# Patient Record
Sex: MALE | Race: OTHER | Employment: FULL TIME | URBAN - METROPOLITAN AREA
[De-identification: names, ages, dates, MRNs, and addresses within clinical notes are randomized per-mention and may not be internally consistent; named-entity substitution may affect disease eponyms.]

---

## 2017-01-27 ENCOUNTER — TELEPHONE (OUTPATIENT)
Dept: FAMILY MEDICINE CLINIC | Age: 57
End: 2017-01-27

## 2017-01-27 NOTE — TELEPHONE ENCOUNTER
Place call to [facility] in reference to [facility] ER follow up/My-chart message in reference to referral to GI. Voicemail is not set up to leave a message.

## 2017-01-30 NOTE — TELEPHONE ENCOUNTER
Place call to [facility] per his mobile device not excepting any phones at this time.

## 2017-02-06 NOTE — TELEPHONE ENCOUNTER
Spoke with patient. He was advised that a follow up is needed to discuss [facility] ED visit. Offered to schedule patient for an appointment during phone conversation but he declines stating he does not have his work schedule. Patient states he will call back to schedule.

## 2017-07-05 ENCOUNTER — OFFICE VISIT (OUTPATIENT)
Dept: FAMILY MEDICINE CLINIC | Age: 57
End: 2017-07-05

## 2017-07-05 VITALS
DIASTOLIC BLOOD PRESSURE: 80 MMHG | TEMPERATURE: 98.2 F | HEART RATE: 83 BPM | WEIGHT: 238 LBS | BODY MASS INDEX: 33.32 KG/M2 | OXYGEN SATURATION: 98 % | RESPIRATION RATE: 16 BRPM | HEIGHT: 71 IN | SYSTOLIC BLOOD PRESSURE: 122 MMHG

## 2017-07-05 DIAGNOSIS — H02.9 EYELID ABNORMALITY: ICD-10-CM

## 2017-07-05 DIAGNOSIS — E78.00 HYPERCHOLESTEROLEMIA: ICD-10-CM

## 2017-07-05 DIAGNOSIS — E55.9 VITAMIN D DEFICIENCY: ICD-10-CM

## 2017-07-05 DIAGNOSIS — B35.4 TINEA CORPORIS: ICD-10-CM

## 2017-07-05 DIAGNOSIS — R22.32 MASS OF LEFT WRIST: Primary | ICD-10-CM

## 2017-07-05 DIAGNOSIS — Z12.5 PROSTATE CANCER SCREENING: ICD-10-CM

## 2017-07-05 DIAGNOSIS — M25.532 WRIST PAIN, ACUTE, LEFT: ICD-10-CM

## 2017-07-05 DIAGNOSIS — R73.03 PREDIABETES: ICD-10-CM

## 2017-07-05 RX ORDER — GLUCOSAMINE SULFATE 1500 MG
3000 POWDER IN PACKET (EA) ORAL DAILY
COMMUNITY
End: 2017-07-18

## 2017-07-05 RX ORDER — CLOTRIMAZOLE AND BETAMETHASONE DIPROPIONATE 10; .64 MG/G; MG/G
CREAM TOPICAL
Qty: 15 G | Refills: 0 | Status: SHIPPED | OUTPATIENT
Start: 2017-07-05 | End: 2018-08-23 | Stop reason: SDUPTHER

## 2017-07-05 NOTE — PROGRESS NOTES
Chief Complaint   Patient presents with    Cyst     left wrist cyst x 3-4 days    Skin Problem     mole on right eye; larger in size     1. Have you been to the ER, urgent care clinic since your last visit? Hospitalized since your last visit? No    2. Have you seen or consulted any other health care providers outside of the [address] [place] [address] since your last visit? Include any pap smears or colon screening.  No

## 2017-07-05 NOTE — PROGRESS NOTES
SUBJECTIVE  Chief Complaint   Patient presents with    Cyst     left wrist cyst x 3-4 days    Skin Problem     mole on right eye; larger in size     Few issues. Noticed left radial sided swelling and pain for a few days. Has a lesion on his right upper eyelid that he wants removed. Has ha rash on his inner right shin that is itchy. OBJECTIVE    Blood pressure 122/80, pulse 83, temperature 98.2 °F (36.8 °C), temperature source Oral, resp. rate 16, height 5' 11\" (1.803 m), weight 238 lb (108 kg), SpO2 98 %. General:  Alert, cooperative, well appearing, in no apparent distress. Head:  Head is symmetric, normocephalic without evidence of trauma or deformity. Eyes:  Pupils are equally round and reactive to light with accommodation. The extra-ocular movements are intact. The lids are without swelling or drainage. There is a slightly hyperppigmented growth on his right eyelid that is about 4mm in length and 2mm in width. Tip is verrucous. The conjunctiva are clear and noninjected. Skin:  There is a 1cm irregularly bordered flat lesion on his left inner shin that appears with a slightly raised erythematous border with central clearing. Left wrist:  Full ROM. There is a firm large 3cm x 4cm mass on the radial ventral side of his wrist.  No bony tenderness. No motor deficits. ASSESSMENT / PLAN    ICD-10-CM ICD-9-CM    1. Mass of left wrist R22.32 719.63 cholecalciferol (VITAMIN D3) 1,000 unit cap      MRI WRIST LT WO CONT   2. Wrist pain, acute, left M25.532 719.43 cholecalciferol (VITAMIN D3) 1,000 unit cap      MRI WRIST LT WO CONT   3. Eyelid abnormality H02.9 374.9 REFERRAL TO PLASTIC SURGERY   4. Tinea corporis B35.4 110.5    5. Hypercholesterolemia E78.00 272.0 CBC W/O DIFF      METABOLIC PANEL, COMPREHENSIVE      LIPID PANEL   6. Vitamin D deficiency E55.9 268.9 VITAMIN D, 25 HYDROXY   7.  Prediabetes R73.03 790.29 CBC W/O DIFF      METABOLIC PANEL, COMPREHENSIVE      HEMOGLOBIN A1C W/O EAG   8. Prostate cancer screening Z12.5 V76.44 PROSTATE SPECIFIC AG (PSA)     Mass of left wrist with pain - MRI of wrist without contrast.  Likely a ganglion cyst.  Pt ed handout. Eyelid abnormality - skin tag versus wart. He will see a plastic surgeon due to the location. Tinea corporis - topical lotrisone twice daily for 2 weeks. Prediabetes / hypercholesterolemia / Vit D def - ordered labs that are due. 15 minutes of face to face time spent with the patient with at least 50% on counseling on above medical issues. All chart history elements were reviewed by me at the time of the visit even though marked at time of note closure. Patient understands our medical plan. Patient has provided input and agrees with goals. Alternatives have been explained and offered. All questions answered. The patient is to call if condition worsens or fails to improve. Follow-up Disposition:  Return in about 2 weeks (around 7/19/2017), or routine care. Fasting labs due 3-7 days prior to appointment.

## 2017-07-05 NOTE — PATIENT INSTRUCTIONS
Ganglions: Care Instructions  Your Care Instructions    A ganglion is a small sac, or cyst, filled with a clear fluid that is like jelly. A ganglion may look like a bump on the hand or wrist. It also can appear on your feet, ankles, knees, or shoulders. It is not cancer. A ganglion can grow out of the protective area, or capsule, around a joint. It also can grow on a tendon sheath, which covers the ropelike tendons that connect muscle to bone. A ganglion may hurt or cause numbness if it presses on a nerve. Many ganglions do not need treatment, and they often go away on their own. But if a ganglion hurts, becomes larger, causes numbness, or limits your activity, your doctor may want to drain it with a needle and syringe or remove it with minor surgery. Follow-up care is a key part of your treatment and safety. Be sure to make and go to all appointments, and call your doctor if you are having problems. It's also a good idea to know your test results and keep a list of the medicines you take. How can you care for yourself at home? · Wear a wrist or finger splint as directed by your doctor. It will keep your wrist or hand from moving and help reduce the fluid in the cyst. This may be all you need for the ganglion to shrink and go away. · Do not smash a ganglion with a book or other heavy object. You may break a bone or otherwise injure your wrist by trying this folk remedy, and the ganglion may return anyway. · Do not try to drain the fluid by poking the ganglion with a pin or any other sharp object. You could cause an infection. When should you call for help? Call your doctor now or seek immediate medical care if:  · You have signs of infection, such as:  ¨ Increased pain, swelling, warmth, or redness. ¨ Red streaks leading from the cyst.  ¨ Pus draining from the cyst.  ¨ A fever. Watch closely for changes in your health, and be sure to contact your doctor if:  · You have increasing pain.   · Your ganglion is getting larger. · You still have pain or numbness from a ganglion. Where can you learn more? Go to http://marcela-alex.info/. Enter O174 in the search box to learn more about \"Ganglions: Care Instructions. \"  Current as of: March 21, 2017  Content Version: 11.3  © 7304-7064 [employer]. Care instructions adapted under license by [employer] (which disclaims liability or warranty for this information). If you have questions about a medical condition or this instruction, always ask your healthcare professional. [place] [address] any warranty or liability for your use of this information.     Dr. Shruthi Chapman   [address] [facility] [place], [address]  [phone]

## 2017-07-05 NOTE — MR AVS SNAPSHOT
Visit Information Date & Time Provider Department Dept. Phone Encounter #  
 7/5/2017  5:15 PM Leidy Helm, [address] [id number] Follow-up Instructions Return in about 2 weeks (around 7/19/2017), or routine care. Fasting labs due 3-7 days prior to appointment. Upcoming Health Maintenance Date Due Hepatitis C Screening 1960 INFLUENZA AGE 9 TO ADULT 8/1/2017 COLONOSCOPY 10/18/2023 DTaP/Tdap/Td series (3 - Td) 10/6/2026 Allergies as of 7/5/2017  Review Complete On: 12/28/2016 By: Rafat Magana NP No Known Allergies Current Immunizations  Reviewed on 10/6/2016 Name Date Influenza Vaccine 9/8/2014 Influenza Vaccine (Quad) PF 9/15/2016 TDAP Vaccine 2/5/2005 Tdap 10/6/2016 Not reviewed this visit You Were Diagnosed With   
  
 Codes Comments Mass of left wrist    -  Primary ICD-10-CM: R22.32 
ICD-9-CM: 719.63 Wrist pain, acute, left     ICD-10-CM: M25.532 ICD-9-CM: 719.43 Eyelid abnormality     ICD-10-CM: H02.9 ICD-9-CM: 374.9 Tinea corporis     ICD-10-CM: B35.4 ICD-9-CM: 110.5 Hypercholesterolemia     ICD-10-CM: E78.00 ICD-9-CM: 272.0 Vitamin D deficiency     ICD-10-CM: E55.9 ICD-9-CM: 268.9 Prediabetes     ICD-10-CM: R73.03 
ICD-9-CM: 790.29 Prostate cancer screening     ICD-10-CM: Z12.5 ICD-9-CM: V76.44 Vitals BP Pulse Temp Resp Height(growth percentile) Weight(growth percentile) 122/80 (BP 1 Location: Left arm, BP Patient Position: Sitting) 83 98.2 °F (36.8 °C) (Oral) 16 5' 11\" (1.803 m) 238 lb (108 kg) SpO2 BMI Smoking Status 98% 33.19 kg/m2 Former Smoker Vitals History BMI and BSA Data Body Mass Index Body Surface Area  
 33.19 kg/m 2 2.33 m 2 Preferred Pharmacy Pharmacy Name Phone [facility], [address] [place] [address] [phone] Your Updated Medication List  
  
   
This list is accurate as of: 7/5/17  5:56 PM.  Always use your most recent med list.  
  
  
  
  
 ammonium lactate 12 % topical cream  
Commonly known as:  LAC-HYDRIN Apply  to affected area two (2) times a day. rub in to affected area on feet as needed. atorvastatin 20 mg tablet Commonly known as:  LIPITOR  
TAKE 1 TABLET nightly Blood-Glucose Meter monitoring kit Use as directed  
  
 clotrimazole-betamethasone topical cream  
Commonly known as:  Brenda Canal Apply to left medial shin - pea sized amount or less twice daily for 2 weeks  
  
 cpap machine kit  
by Does Not Apply route. Auto CPAP at Range of 5-15  with heated humidifier. Mask: Mask of choice, please refit. Length of need 99 months. Replace mask and accessories as needed times 12 months. Download data at 30 days and fax at [phone]. Supplies for 99 month  
  
 diclofenac 1 % Gel Commonly known as:  VOLTAREN Apply  to affected area four (4) times daily. Apply to right knee four times daily  
  
 escitalopram oxalate 20 mg tablet Commonly known as:  Helen Polio Take 1 Tab by mouth daily. glucose blood VI test strips strip Commonly known as:  ASCENSIA AUTODISC VI, ONE TOUCH ULTRA TEST VI Check fasting and 1 hour after meals as needed. ketoconazole 2 % topical cream  
Commonly known as:  NIZORAL Apply  to affected area two (2) times a day. Lancets Misc Use as directed. loperamide 2 mg tablet Commonly known as:  IMMODIUM  
4mg initial dose, then 2mg after each loose stool max of 12 mg daily  Indications: Diarrhea BRYSON MULTIVITAMIN FOR MEN PO Take  by mouth. SYNTHROID 200 mcg tablet Generic drug:  levothyroxine Take 200 mcg by mouth Daily (before breakfast). triamcinolone acetonide 0.1 % topical cream  
Commonly known as:  KENALOG Apply  to affected area two (2) times daily as needed for Skin Irritation. use thin layer vardenafil 20 mg tablet Commonly known as:  LEVITRA Take 20 mg by mouth as needed. VITAMIN B-12 500 mcg tablet Generic drug:  cyanocobalamin Take 1,500 mcg by mouth daily. VITAMIN D3 1,000 unit Cap Generic drug:  cholecalciferol Take 3,000 Units by mouth daily. Prescriptions Sent to Pharmacy Refills  
 clotrimazole-betamethasone (LOTRISONE) topical cream 0 Sig: Apply to left medial shin - pea sized amount or less twice daily for 2 weeks Class: Normal  
 Pharmacy: [place] [id number] [address] [place] [address], [facility] #: [phone] We Performed the Following REFERRAL TO PLASTIC SURGERY [REF89 Custom] Comments:  
 Please evaluate patient for right upper eyelid lesion Follow-up Instructions Return in about 2 weeks (around 7/19/2017), or routine care. Fasting labs due 3-7 days prior to appointment. To-Do List   
 07/05/2017 Lab:  CBC W/O DIFF   
  
 07/05/2017 Lab:  HEMOGLOBIN A1C W/O EAG   
  
 07/05/2017 Lab:  LIPID PANEL   
  
 07/05/2017 Lab:  METABOLIC PANEL, COMPREHENSIVE   
  
 07/05/2017 Imaging:  MRI WRIST LT WO CONT   
  
 07/05/2017 Lab:  PROSTATE SPECIFIC AG (PSA) 07/05/2017 Lab:  VITAMIN D, 25 HYDROXY Referral Information Referral ID Referred By Referred To  
  
 [id number] [address] Phone: [phone] Fax: [phone] Visits Status Start Date End Date 1 New Request 7/5/17 7/5/18 If your referral has a status of pending review or denied, additional information will be sent to support the outcome of this decision. Patient Instructions Ganglions: Care Instructions Your Care Instructions A ganglion is a small sac, or cyst, filled with a clear fluid that is like jelly.  A ganglion may look like a bump on the hand or wrist. It also can appear on your feet, ankles, knees, or shoulders. It is not cancer. A ganglion can grow out of the protective area, or capsule, around a joint. It also can grow on a tendon sheath, which covers the ropelike tendons that connect muscle to bone. A ganglion may hurt or cause numbness if it presses on a nerve. Many ganglions do not need treatment, and they often go away on their own. But if a ganglion hurts, becomes larger, causes numbness, or limits your activity, your doctor may want to drain it with a needle and syringe or remove it with minor surgery. Follow-up care is a key part of your treatment and safety. Be sure to make and go to all appointments, and call your doctor if you are having problems. It's also a good idea to know your test results and keep a list of the medicines you take. How can you care for yourself at home? · Wear a wrist or finger splint as directed by your doctor. It will keep your wrist or hand from moving and help reduce the fluid in the cyst. This may be all you need for the ganglion to shrink and go away. · Do not smash a ganglion with a book or other heavy object. You may break a bone or otherwise injure your wrist by trying this folk remedy, and the ganglion may return anyway. · Do not try to drain the fluid by poking the ganglion with a pin or any other sharp object. You could cause an infection. When should you call for help? Call your doctor now or seek immediate medical care if: 
· You have signs of infection, such as: 
¨ Increased pain, swelling, warmth, or redness. ¨ Red streaks leading from the cyst. 
¨ Pus draining from the cyst. 
¨ A fever. Watch closely for changes in your health, and be sure to contact your doctor if: 
· You have increasing pain. · Your ganglion is getting larger. · You still have pain or numbness from a ganglion. Where can you learn more? Go to http://marcela-alex.info/. Enter Y375 in the search box to learn more about \"Ganglions: Care Instructions. \" Current as of: March 21, 2017 Content Version: 11.3 © 1412-9209 [employer]. Care instructions adapted under license by [employer] (which disclaims liability or warranty for this information). If you have questions about a medical condition or this instruction, always ask your healthcare professional. [address]  any warranty or liability for your use of this information. Dr. Gabe Dee [address], [address] 
[phone] Introducing \[facility]\"" & HEALTH SERVICES! Dear Andrey Hidalgo: Thank you for requesting a [facility] account. Our records indicate that you already have an active [facility] account. You can access your account anytime at [email or web address]. [email or web address] Did you know that you can access your hospital and ER discharge instructions at any time in [facility]? You can also review all of your test results from your hospital stay or ER visit. Additional Information If you have questions, please visit the Frequently Asked Questions section of the [employer] website at [email or web address] Remember, [facility] is NOT to be used for urgent needs. For medical emergencies, dial 911. Now available from your iPhone and Android! Please provide this summary of care documentation to your next provider. Your primary care clinician is listed as Taryn Campbell. If you have any questions after today's visit, please call [phone].

## 2017-07-07 ENCOUNTER — HOSPITAL ENCOUNTER (OUTPATIENT)
Dept: LAB | Age: 57
Discharge: HOME | End: 2017-07-07
Payer: COMMERCIAL

## 2017-07-07 DIAGNOSIS — R73.03 PREDIABETES: ICD-10-CM

## 2017-07-07 DIAGNOSIS — E55.9 VITAMIN D DEFICIENCY: ICD-10-CM

## 2017-07-07 DIAGNOSIS — Z12.5 PROSTATE CANCER SCREENING: ICD-10-CM

## 2017-07-07 DIAGNOSIS — E78.00 HYPERCHOLESTEROLEMIA: ICD-10-CM

## 2017-07-07 LAB
25(OH)D3 SERPL-MCNC: 29.9 NG/ML (ref 30–100)
ALBUMIN SERPL BCP-MCNC: 3.9 G/DL (ref 3.4–5)
ALBUMIN/GLOB SERPL: 0.9 {RATIO} (ref 0.8–1.7)
ALP SERPL-CCNC: 90 U/L (ref 45–117)
ALT SERPL-CCNC: 43 U/L (ref 16–61)
ANION GAP BLD CALC-SCNC: 6 MMOL/L (ref 3–18)
AST SERPL W P-5'-P-CCNC: 25 U/L (ref 15–37)
BILIRUB SERPL-MCNC: 0.6 MG/DL (ref 0.2–1)
BUN SERPL-MCNC: 14 MG/DL (ref 7–18)
BUN/CREAT SERPL: 14 (ref 12–20)
CALCIUM SERPL-MCNC: 8.8 MG/DL (ref 8.5–10.1)
CHLORIDE SERPL-SCNC: 103 MMOL/L (ref 100–108)
CHOLEST SERPL-MCNC: 174 MG/DL
CO2 SERPL-SCNC: 30 MMOL/L (ref 21–32)
CREAT SERPL-MCNC: 1.03 MG/DL (ref 0.6–1.3)
ERYTHROCYTE [DISTWIDTH] IN BLOOD BY AUTOMATED COUNT: 13.2 % (ref 11.6–14.5)
GLOBULIN SER CALC-MCNC: 4.2 G/DL (ref 2–4)
GLUCOSE SERPL-MCNC: 114 MG/DL (ref 74–99)
HBA1C MFR BLD: 6.6 % (ref 4.2–5.6)
HCT VFR BLD AUTO: 44.3 % (ref 36–48)
HDLC SERPL-MCNC: 46 MG/DL (ref 40–60)
HDLC SERPL: 3.8 {RATIO} (ref 0–5)
HGB BLD-MCNC: 15.2 G/DL (ref 13–16)
LDLC SERPL CALC-MCNC: 77.4 MG/DL (ref 0–100)
LIPID PROFILE,FLP: ABNORMAL
MCH RBC QN AUTO: 29.2 PG (ref 24–34)
MCHC RBC AUTO-ENTMCNC: 34.3 G/DL (ref 31–37)
MCV RBC AUTO: 85 FL (ref 74–97)
PLATELET # BLD AUTO: 235 K/UL (ref 135–420)
PMV BLD AUTO: 10.3 FL (ref 9.2–11.8)
POTASSIUM SERPL-SCNC: 4.2 MMOL/L (ref 3.5–5.5)
PROT SERPL-MCNC: 8.1 G/DL (ref 6.4–8.2)
PSA SERPL-MCNC: 1.2 NG/ML (ref 0–4)
RBC # BLD AUTO: 5.21 M/UL (ref 4.7–5.5)
SODIUM SERPL-SCNC: 139 MMOL/L (ref 136–145)
TRIGL SERPL-MCNC: 253 MG/DL (ref ?–150)
VLDLC SERPL CALC-MCNC: 50.6 MG/DL
WBC # BLD AUTO: 5.9 K/UL (ref 4.6–13.2)

## 2017-07-07 PROCEDURE — 83036 HEMOGLOBIN GLYCOSYLATED A1C: CPT | Performed by: FAMILY MEDICINE

## 2017-07-07 PROCEDURE — 80053 COMPREHEN METABOLIC PANEL: CPT | Performed by: FAMILY MEDICINE

## 2017-07-07 PROCEDURE — 80061 LIPID PANEL: CPT | Performed by: FAMILY MEDICINE

## 2017-07-07 PROCEDURE — 85027 COMPLETE CBC AUTOMATED: CPT | Performed by: FAMILY MEDICINE

## 2017-07-07 PROCEDURE — 36415 COLL VENOUS BLD VENIPUNCTURE: CPT | Performed by: FAMILY MEDICINE

## 2017-07-07 PROCEDURE — 84153 ASSAY OF PSA TOTAL: CPT | Performed by: FAMILY MEDICINE

## 2017-07-07 PROCEDURE — 82306 VITAMIN D 25 HYDROXY: CPT | Performed by: FAMILY MEDICINE

## 2017-07-12 ENCOUNTER — HOSPITAL ENCOUNTER (OUTPATIENT)
Age: 57
Discharge: HOME | End: 2017-07-12
Attending: FAMILY MEDICINE
Payer: COMMERCIAL

## 2017-07-12 DIAGNOSIS — R22.32 MASS OF LEFT WRIST: ICD-10-CM

## 2017-07-12 DIAGNOSIS — M25.532 WRIST PAIN, ACUTE, LEFT: ICD-10-CM

## 2017-07-12 PROCEDURE — 73221 MRI JOINT UPR EXTREM W/O DYE: CPT

## 2017-07-18 ENCOUNTER — OFFICE VISIT (OUTPATIENT)
Dept: FAMILY MEDICINE CLINIC | Age: 57
End: 2017-07-18

## 2017-07-18 VITALS
SYSTOLIC BLOOD PRESSURE: 124 MMHG | HEIGHT: 71 IN | BODY MASS INDEX: 33.18 KG/M2 | WEIGHT: 237 LBS | DIASTOLIC BLOOD PRESSURE: 74 MMHG | RESPIRATION RATE: 14 BRPM | OXYGEN SATURATION: 95 % | HEART RATE: 68 BPM | TEMPERATURE: 98.2 F

## 2017-07-18 DIAGNOSIS — Z00.00 ROUTINE MEDICAL EXAM: Primary | ICD-10-CM

## 2017-07-18 DIAGNOSIS — F41.9 ANXIETY: ICD-10-CM

## 2017-07-18 DIAGNOSIS — E78.00 HYPERCHOLESTEROLEMIA: ICD-10-CM

## 2017-07-18 DIAGNOSIS — E11.9 CONTROLLED TYPE 2 DIABETES MELLITUS WITHOUT COMPLICATION, WITHOUT LONG-TERM CURRENT USE OF INSULIN (HCC): ICD-10-CM

## 2017-07-18 DIAGNOSIS — N52.9 ERECTILE DYSFUNCTION, UNSPECIFIED ERECTILE DYSFUNCTION TYPE: ICD-10-CM

## 2017-07-18 DIAGNOSIS — E55.9 VITAMIN D DEFICIENCY: ICD-10-CM

## 2017-07-18 DIAGNOSIS — E03.4 HYPOTHYROIDISM DUE TO ACQUIRED ATROPHY OF THYROID: ICD-10-CM

## 2017-07-18 DIAGNOSIS — L60.9 NAIL ABNORMALITIES: ICD-10-CM

## 2017-07-18 DIAGNOSIS — Z12.11 COLON CANCER SCREENING: ICD-10-CM

## 2017-07-18 DIAGNOSIS — M67.432 GANGLION, LEFT WRIST: ICD-10-CM

## 2017-07-18 LAB
ALBUMIN UR QL STRIP: 10 MG/L
CREATININE, URINE POC: 100 MG/DL
MICROALBUMIN/CREAT RATIO POC: <30 MG/G

## 2017-07-18 RX ORDER — VARDENAFIL HYDROCHLORIDE 20 MG/1
20 TABLET ORAL AS NEEDED
Qty: 30 TAB | Refills: 5 | Status: SHIPPED | OUTPATIENT
Start: 2017-07-18

## 2017-07-18 RX ORDER — ATORVASTATIN CALCIUM 20 MG/1
TABLET, FILM COATED ORAL
Qty: 90 TAB | Refills: 3 | Status: SHIPPED | OUTPATIENT
Start: 2017-07-18 | End: 2019-10-01 | Stop reason: SDUPTHER

## 2017-07-18 RX ORDER — ERGOCALCIFEROL 1.25 MG/1
50000 CAPSULE ORAL
Qty: 12 CAP | Refills: 3 | Status: SHIPPED | OUTPATIENT
Start: 2017-07-18 | End: 2018-06-18 | Stop reason: SDUPTHER

## 2017-07-18 NOTE — PATIENT INSTRUCTIONS
Learning About Diabetes Food Guidelines  Your Care Instructions  Meal planning is important to manage diabetes. It helps keep your blood sugar at a target level (which you set with your doctor). You don't have to eat special foods. You can eat what your family eats, including sweets once in a while. But you do have to pay attention to how often you eat and how much you eat of certain foods. You may want to work with a dietitian or a certified diabetes educator (CDE) to help you plan meals and snacks. A dietitian or CDE can also help you lose weight if that is one of your goals. What should you know about eating carbs? Managing the amount of carbohydrate (carbs) you eat is an important part of healthy meals when you have diabetes. Carbohydrate is found in many foods. · Learn which foods have carbs. And learn the amounts of carbs in different foods. ¨ Bread, cereal, pasta, and rice have about 15 grams of carbs in a serving. A serving is 1 slice of bread (1 ounce), ½ cup of cooked cereal, or 1/3 cup of cooked pasta or rice. ¨ Fruits have 15 grams of carbs in a serving. A serving is 1 small fresh fruit, such as an apple or orange; ½ of a banana; ½ cup of cooked or canned fruit; ½ cup of fruit juice; 1 cup of melon or raspberries; or 2 tablespoons of dried fruit. ¨ Milk and no-sugar-added yogurt have 15 grams of carbs in a serving. A serving is 1 cup of milk or 2/3 cup of no-sugar-added yogurt. ¨ Starchy vegetables have 15 grams of carbs in a serving. A serving is ½ cup of mashed potatoes or sweet potato; 1 cup winter squash; ½ of a small baked potato; ½ cup of cooked beans; or ½ cup cooked corn or green peas. · Learn how much carbs to eat each day and at each meal. A dietitian or CDE can teach you how to keep track of the amount of carbs you eat. This is called carbohydrate counting. · If you are not sure how to count carbohydrate grams, use the Plate Method to plan meals.  It is a good, quick way to make sure that you have a balanced meal. It also helps you spread carbs throughout the day. ¨ Divide your plate by types of foods. Put non-starchy vegetables on half the plate, meat or other protein food on one-quarter of the plate, and a grain or starchy vegetable in the final quarter of the plate. To this you can add a small piece of fruit and 1 cup of milk or yogurt, depending on how many carbs you are supposed to eat at a meal.  · Try to eat about the same amount of carbs at each meal. Do not \"save up\" your daily allowance of carbs to eat at one meal.  · Proteins have very little or no carbs per serving. Examples of proteins are beef, chicken, turkey, fish, eggs, tofu, cheese, cottage cheese, and peanut butter. A serving size of meat is 3 ounces, which is about the size of a deck of cards. Examples of meat substitute serving sizes (equal to 1 ounce of meat) are 1/4 cup of cottage cheese, 1 egg, 1 tablespoon of peanut butter, and ½ cup of tofu. How can you eat out and still eat healthy? · Learn to estimate the serving sizes of foods that have carbohydrate. If you measure food at home, it will be easier to estimate the amount in a serving of restaurant food. · If the meal you order has too much carbohydrate (such as potatoes, corn, or baked beans), ask to have a low-carbohydrate food instead. Ask for a salad or green vegetables. · If you use insulin, check your blood sugar before and after eating out to help you plan how much to eat in the future. · If you eat more carbohydrate at a meal than you had planned, take a walk or do other exercise. This will help lower your blood sugar. What else should you know? · Limit saturated fat, such as the fat from meat and dairy products. This is a healthy choice because people who have diabetes are at higher risk of heart disease. So choose lean cuts of meat and nonfat or low-fat dairy products. Use olive or canola oil instead of butter or shortening when cooking.   · Don't skip meals. Your blood sugar may drop too low if you skip meals and take insulin or certain medicines for diabetes. · Check with your doctor before you drink alcohol. Alcohol can cause your blood sugar to drop too low. Alcohol can also cause a bad reaction if you take certain diabetes medicines. Follow-up care is a key part of your treatment and safety. Be sure to make and go to all appointments, and call your doctor if you are having problems. It's also a good idea to know your test results and keep a list of the medicines you take. Where can you learn more? Go to http://marcela-alex.info/. Enter N984 in the search box to learn more about \"Learning About Diabetes Food Guidelines. \"  Current as of: March 13, 2017  Content Version: 11.3  © 6804-0474 [employer]. Care instructions adapted under license by [employer] (which disclaims liability or warranty for this information). If you have questions about a medical condition or this instruction, always ask your healthcare professional. [address] any warranty or liability for your use of this information. Learning About Meal Planning for Diabetes  Why plan your meals? Meal planning can be a key part of managing diabetes. Planning meals and snacks with the right balance of carbohydrate, protein, and fat can help you keep your blood sugar at the target level you set with your doctor. You don't have to eat special foods. You can eat what your family eats, including sweets once in a while. But you do have to pay attention to how often you eat and how much you eat of certain foods. You may want to work with a dietitian or a certified diabetes educator. He or she can give you tips and meal ideas and can answer your questions about meal planning. This health professional can also help you reach a healthy weight if that is one of your goals. What plan is right for you?   Your dietitian or diabetes educator may suggest that you start with the plate format or carbohydrate counting. The plate format  The plate format is a simple way to help you manage how you eat. You plan meals by learning how much space each food should take on a plate. Using the plate format helps you spread carbohydrate throughout the day. It can make it easier to keep your blood sugar level within your target range. It also helps you see if you're eating healthy portion sizes. To use the plate format, you put non-starchy vegetables on half your plate. Add meat or meat substitutes on one-quarter of the plate. Put a grain or starchy vegetable (such as brown rice or a potato) on the final quarter of the plate. You can add a small piece of fruit and some low-fat or fat-free milk or yogurt, depending on your carbohydrate goal for each meal.  Here are some tips for using the plate format:  · Make sure that you are not using an oversized plate. A 9-inch plate is best. Many restaurants use larger plates. · Get used to using the plate format at home. Then you can use it when you eat out. · Write down your questions about using the plate format. Talk to your doctor, a dietitian, or a diabetes educator about your concerns. Carbohydrate counting  With carbohydrate counting, you plan meals based on the amount of carbohydrate in each food. Carbohydrate raises blood sugar higher and more quickly than any other nutrient. It is found in desserts, breads and cereals, and fruit. It's also found in starchy vegetables such as potatoes and corn, grains such as rice and pasta, and milk and yogurt. Spreading carbohydrate throughout the day helps keep your blood sugar levels within your target range. Your daily amount depends on several things, including your weight, how active you are, which diabetes medicines you take, and what your goals are for your blood sugar levels.  A registered dietitian or diabetes educator can help you plan how much carbohydrate to include in each meal and snack. A guideline for your daily amount of carbohydrate is:  · 45 to 60 grams at each meal. That's about the same as 3 to 4 carbohydrate servings. · 15 to 20 grams at each snack. That's about the same as 1 carbohydrate serving. The Nutrition Facts label on packaged foods tells you how much carbohydrate is in a serving of the food. First, look at the serving size on the food label. Is that the amount you eat in a serving? All of the nutrition information on a food label is based on that serving size. So if you eat more or less than that, you'll need to adjust the other numbers. Total carbohydrate is the next thing you need to look for on the label. If you count carbohydrate servings, one serving of carbohydrate is 15 grams. For foods that don't come with labels, such as fresh fruits and vegetables, you'll need a guide that lists carbohydrate in these foods. Ask your doctor, dietitian, or diabetes educator about books or other nutrition guides you can use. If you take insulin, you need to know how many grams of carbohydrate are in a meal. This lets you know how much rapid-acting insulin to take before you eat. If you use an insulin pump, you get a constant rate of insulin during the day. So the pump must be programmed at meals to give you extra insulin to cover the rise in blood sugar after meals. When you know how much carbohydrate you will eat, you can take the right amount of insulin. Or, if you always use the same amount of insulin, you need to make sure that you eat the same amount of carbohydrate at meals. If you need more help to understand carbohydrate counting and food labels, ask your doctor, dietitian, or diabetes educator. How do you get started with meal planning? Here are some tips to get started:  · Plan your meals a week at a time. Don't forget to include snacks too. · Use cookbooks or online recipes to plan several main meals. Plan some quick meals for busy nights. You also can double some recipes that freeze well. Then you can save half for other busy nights when you don't have time to cook. · Make sure you have the ingredients you need for your recipes. If you're running low on basic items, put these items on your shopping list too. · List foods that you use to make breakfasts, lunches, and snacks. List plenty of fruits and vegetables. · Post this list on the refrigerator. Add to it as you think of more things you need. · Take the list to the store to do your weekly shopping. Follow-up care is a key part of your treatment and safety. Be sure to make and go to all appointments, and call your doctor if you are having problems. It's also a good idea to know your test results and keep a list of the medicines you take. Where can you learn more? Go to http://marcela-alex.info/. Kishore Kid in the search box to learn more about \"Learning About Meal Planning for Diabetes. \"  Current as of: March 13, 2017  Content Version: 11.3  © 1459-8429 [employer]. Care instructions adapted under license by [employer] (which disclaims liability or warranty for this information). If you have questions about a medical condition or this instruction, always ask your healthcare professional. [place] [address] any warranty or liability for your use of this information.     [facility] : Dr. Barry Galindo [phone]  Colorectal Surgery: Dr. Ivis Smith [phone]   Orthopedics: Dr. Yin Malloy [phone]    Follow up in 6 months for routine care and POC A1C in office

## 2017-07-18 NOTE — PROGRESS NOTES
Chief Complaint   Patient presents with    Results     lab review    Cholesterol Problem    Vitamin D Deficiency    Anxiety    Diabetes     1. Have you been to the ER, urgent care clinic since your last visit? Hospitalized since your last visit? No    2. Have you seen or consulted any other health care providers outside of the [address] [place] [address] since your last visit? Include any pap smears or colon screening.  No

## 2017-07-18 NOTE — PROGRESS NOTES
Subjective:   Kendrick Rodriguez is a 56 y.o. male presenting for his annual checkup. ROS:  Feeling well. No dyspnea or chest pain on exertion. No abdominal pain, change in bowel habits, black or bloody stools. No urinary tract or prostatic symptoms. No neurological complaints. Specific concerns today:   Had MRI of left wrist showing rather large likely ganglion. This hurts him and he wants to discuss surgical options. Had some adjustments after wife's death but he is coping and taking Lexapro. No harmful ideations expressed actively. A1c levels have increased to diabetic range. He says that with his wife passing it was understandable that his numbers would increase. He is getting more active again. Viagra for ED is working okay. Refills needed. Current Outpatient Prescriptions   Medication Sig Dispense Refill    FOLIC ACID PO Take 1 Tab by mouth daily.  atorvastatin (LIPITOR) 20 mg tablet TAKE 1 TABLET nightly 90 Tab 3    ergocalciferol (ERGOCALCIFEROL) 50,000 unit capsule Take 1 Cap by mouth every seven (7) days. 12 Cap 3    vardenafil (LEVITRA) 20 mg tablet Take 20 mg by mouth as needed. 30 Tab 5    clotrimazole-betamethasone (LOTRISONE) topical cream Apply to left medial shin - pea sized amount or less twice daily for 2 weeks 15 g 0    escitalopram oxalate (LEXAPRO) 20 mg tablet Take 1 Tab by mouth daily. 90 Tab 3    ammonium lactate (LAC-HYDRIN) 12 % topical cream Apply  to affected area two (2) times a day. rub in to affected area on feet as needed. 280 g 11    levothyroxine (SYNTHROID) 200 mcg tablet Take 200 mcg by mouth Daily (before breakfast).  cyanocobalamin (VITAMIN B-12) 500 mcg tablet Take 1,500 mcg by mouth daily.  Blood-Glucose Meter monitoring kit Use as directed 1 kit 0    glucose blood VI test strips (ASCENSIA AUTODISC VI, ONE TOUCH ULTRA TEST VI) strip Check fasting and 1 hour after meals as needed. 3 Package 3    Lancets misc Use as directed.  3 Package 3    cpap machine kit by Does Not Apply route. Auto CPAP at Range of 5-15  with heated humidifier. Mask: Mask of choice, please refit. Length of need 99 months. Replace mask and accessories as needed times 12 months. Download data at 30 days and fax at [phone]. Supplies for 99 month 1 Kit 0    MV-MN/FA/LYCOPENE/LUT/HB#178 (BRYSON MULTIVITAMIN FOR MEN PO) Take  by mouth.  triamcinolone acetonide (KENALOG) 0.1 % topical cream Apply  to affected area two (2) times daily as needed for Skin Irritation. use thin layer 30 g 1     No Known Allergies  Past Medical History:   Diagnosis Date    Allergic rhinitis 3/27/2010    Anxiety     Arthritis     Diabetes ([address]     Dishydrosis     Dizziness     with vertigo    Dyslipidemia     Echocardiogram 09/17/10    EF 60-65%. RVSP 15-20 mmHg.  PUJA    ED (erectile dysfunction)     Hashimoto thyroiditis     intermittently occurs    Head injury     Hx of colonoscopy 07/01/2005    [facility]/Normal; grade 1-to grade 2 internal hemorrhoids    Hypothyroidism 1983    Dr. Oneyda Gilliam Hypovitaminosis D     Thallium stress test 09/17/10    Neg stress test.  Ex. time 11:12.    Unspecified sleep apnea      Past Surgical History:   Procedure Laterality Date    COLONOSCOPY  2005    early due to bloody stool; next colonoscopy due [address] [facility] HX LAP CHOLECYSTECTOMY  9/2014    HX TONSILLECTOMY      as a child     Family History   Problem Relation Age of Onset    Diabetes Mother     Diabetes Father     Heart Disease Father     Diabetes Paternal Aunt     Diabetes Paternal Uncle     Diabetes Paternal Grandmother     Diabetes Paternal Grandfather     Thyroid Disease Brother     Arthritis-osteo Brother      Social History   Substance Use Topics    Smoking status: Former Smoker     Packs/day: 0.30     Years: 9.00     Types: Cigarettes    Smokeless tobacco: Never Used    Alcohol use No      Objective:     Visit Vitals    /74 (BP 1 Location: Left arm, BP Patient Position: Sitting)    Pulse 68    Temp 98.2 °F (36.8 °C) (Oral)    Resp 14    Ht 5' 11\" (1.803 m)    Wt 237 lb (107.5 kg)    SpO2 95%    BMI 33.05 kg/m2     The patient appears well, alert, oriented x 3, in no distress. ENT normal.  Neck supple. No adenopathy or thyromegaly. SARITA. Lungs are clear, good air entry, no wheezes, rhonchi or rales. S1 and S2 normal, no murmurs, regular rate and rhythm. Abdomen is soft without tenderness, guarding, mass or organomegaly. Extremities show no edema, normal peripheral pulses. Neurological is normal without focal findings. Ventral sided wrist lump that is large and tender. Psych: normal affect. Mood good. Oriented x 3. Judgement and insight intact. Skin: a few tags on his eyelid. Thick and irregular greater toenail, left. Results for orders placed or performed in visit on 07/18/17   AMB POC URINE, MICROALBUMIN, SEMIQUANT (3 RESULTS)   Result Value Ref Range    ALBUMIN, URINE POC 10 Negative mg/L    CREATININE, URINE  mg/dL    Microalbumin/creat ratio (POC) <30 MG/G     Results for Tanvi Poewll (MRN [id number]) as of 7/19/2017 18:58   Ref.  Range 7/7/2017 08:50   WBC Latest Ref Range: 4.6 - 13.2 K/uL 5.9   RBC Latest Ref Range: 4.70 - 5.50 M/uL 5.21   HGB Latest Ref Range: 13.0 - 16.0 g/dL 15.2   HCT Latest Ref Range: 36.0 - 48.0 % 44.3   MCV Latest Ref Range: 74.0 - 97.0 FL 85.0   MCH Latest Ref Range: 24.0 - 34.0 PG 29.2   MCHC Latest Ref Range: 31.0 - 37.0 g/dL 34.3   RDW Latest Ref Range: 11.6 - 14.5 % 13.2   PLATELET Latest Ref Range: 135 - 420 K/uL 235   MPV Latest Ref Range: 9.2 - 11.8 FL 10.3   Sodium Latest Ref Range: 136 - 145 mmol/L 139   Potassium Latest Ref Range: 3.5 - 5.5 mmol/L 4.2   Chloride Latest Ref Range: 100 - 108 mmol/L 103   CO2 Latest Ref Range: 21 - 32 mmol/L 30   Anion gap Latest Ref Range: 3.0 - 18 mmol/L 6   Glucose Latest Ref Range: 74 - 99 mg/dL 114 (H)   BUN Latest Ref Range: 7.0 - 18 MG/DL 14   Creatinine Latest Ref Range: 0.6 - 1.3 MG/DL 1.03   BUN/Creatinine ratio Latest Ref Range: 12 - 20   14   Calcium Latest Ref Range: 8.5 - 10.1 MG/DL 8.8   GFR est non-AA Latest Ref Range: >60 ml/min/1.73m2 >60   GFR est AA Latest Ref Range: >60 ml/min/1.73m2 >60   Bilirubin, total Latest Ref Range: 0.2 - 1.0 MG/DL 0.6   Protein, total Latest Ref Range: 6.4 - 8.2 g/dL 8.1   Albumin Latest Ref Range: 3.4 - 5.0 g/dL 3.9   Globulin Latest Ref Range: 2.0 - 4.0 g/dL 4.2 (H)   A-G Ratio Latest Ref Range: 0.8 - 1.7   0.9   ALT (SGPT) Latest Ref Range: 16 - 61 U/L 43   AST Latest Ref Range: 15 - 37 U/L 25   Alk. phosphatase Latest Ref Range: 45 - 117 U/L 90   Triglyceride Latest Ref Range: <150 MG/ (H)   Cholesterol, total Latest Ref Range: <200 MG/   HDL Cholesterol Latest Ref Range: 40 - 60 MG/DL 46   CHOL/HDL Ratio Latest Ref Range: 0 - 5.0   3.8   LDL, calculated Latest Ref Range: 0 - 100 MG/DL 77.4   VLDL, calculated Latest Units: MG/DL 50.6   Hemoglobin A1c, (calculated) Latest Ref Range: 4.2 - 5.6 % 6.6 (H)   Prostate Specific Ag Latest Ref Range: 0.0 - 4.0 ng/mL 1.2   VITAMIN D, 25 HYDROXY Unknown Rpt (A)       Assessment/Plan:       ICD-10-CM ICD-9-CM    1. Routine medical exam Z00.00 V70.0    2. Controlled type 2 diabetes mellitus without complication, without long-term current use of insulin (HCC) E11.9 250.00 AMB POC URINE, MICROALBUMIN, SEMIQUANT (3 RESULTS)   3. Ganglion, left wrist M67.432 727.41 REFERRAL TO ORTHOPEDICS   4. Vitamin D deficiency E55.9 268.9 ergocalciferol (ERGOCALCIFEROL) 50,000 unit capsule   5. Hypercholesterolemia E78.00 272.0 atorvastatin (LIPITOR) 20 mg tablet   6. Anxiety F41.9 300.00    7. Hypothyroidism due to acquired atrophy of thyroid E03.4 244.8      246.8    8. Erectile dysfunction, unspecified erectile dysfunction type N52.9 607.84 vardenafil (LEVITRA) 20 mg tablet   9. Colon cancer screening Z12.11 V76.51 REFERRAL TO COLON AND RECTAL SURGERY   10. Nail abnormalities L60.9 703.9 CULTURE, FUNGUS     Age and sex specific counseling. Advised on diabetic eye exam.  Diet and exercise. Recheck A1c in 6 months. Refer to ortho for ganglion cyst removal.  Vit d prescribed 50,000iu weekly. Cont Lipitor. Cont Lexapro. Cont per endocrinology for thyroid. Viagra for ED as needed. Refer for CRCS as he is due. Nail fungal culture of greater toe. All chart history elements were reviewed by me at the time of the visit even though marked at time of note closure. Patient understands our medical plan. Patient has provided input and agrees with goals. Alternatives have been explained and offered. All questions answered. The patient is to call if condition worsens or fails to improve. Follow-up Disposition:  Return in about 6 months (around 1/18/2018) for routine care and POC A1C in office.

## 2017-07-18 NOTE — MR AVS SNAPSHOT
Visit Information Date & Time Provider Department Dept. Phone Encounter #  
 7/18/2017  1:30 PM Travis rBooks, [address] [id number] Follow-up Instructions Return in about 6 months (around 1/18/2018) for routine care and POC A1C in office. Your Appointments 1/18/2018  1:30 PM  
ROUTINE CARE with Travis Brooks MD  
[address] [facility] ([facility]) Appt Note: routine f/u 6mo [address] [facility] [address]  
[address] [facility] [address] [facility] [address] Upcoming Health Maintenance Date Due Hepatitis C Screening 1960 INFLUENZA AGE 9 TO ADULT 8/1/2017 COLONOSCOPY 10/18/2023 DTaP/Tdap/Td series (3 - Td) 10/6/2026 Allergies as of 7/18/2017  Review Complete On: 7/18/2017 By: Anna Pat LPN No Known Allergies Current Immunizations  Reviewed on 7/18/2017 Name Date Influenza Vaccine 9/8/2014 Influenza Vaccine (Quad) PF 9/15/2016 TDAP Vaccine 2/5/2005 Tdap 10/6/2016 Reviewed by Travis Brooks MD on 7/18/2017 at  2:05 PM  
You Were Diagnosed With   
  
 Codes Comments Routine medical exam    -  Primary ICD-10-CM: Z00.00 ICD-9-CM: V70.0 Controlled type 2 diabetes mellitus without complication, without long-term current use of insulin ([facility].)     ICD-10-CM: E11.9 ICD-9-CM: 250.00 Ganglion, left wrist     ICD-10-CM: U13.757 ICD-9-CM: 727.41 Vitamin D deficiency     ICD-10-CM: E55.9 ICD-9-CM: 268.9 Hypercholesterolemia     ICD-10-CM: E78.00 ICD-9-CM: 272.0 Anxiety     ICD-10-CM: F41.9 ICD-9-CM: 300.00 Hypothyroidism due to acquired atrophy of thyroid     ICD-10-CM: E03.4 ICD-9-CM: 244.8, 246.8 Erectile dysfunction, unspecified erectile dysfunction type     ICD-10-CM: N52.9 ICD-9-CM: 607.84 Colon cancer screening     ICD-10-CM: Z12.11 ICD-9-CM: V76.51   
 Nail abnormalities     ICD-10-CM: L60.9 ICD-9-CM: 703.9 Vitals BP Pulse Temp Resp Height(growth percentile) Weight(growth percentile) 124/74 (BP 1 Location: Left arm, BP Patient Position: Sitting) 68 98.2 °F (36.8 °C) (Oral) 14 5' 11\" (1.803 m) 237 lb (107.5 kg) SpO2 BMI Smoking Status 95% 33.05 kg/m2 Former Smoker Vitals History BMI and BSA Data Body Mass Index Body Surface Area 33.05 kg/m 2 2.32 m 2 Preferred Pharmacy Pharmacy Name Phone [facility] [address], [address] [phone] Your Updated Medication List  
  
   
This list is accurate as of: 7/18/17  2:23 PM.  Always use your most recent med list.  
  
  
  
  
 ammonium lactate 12 % topical cream  
Commonly known as:  LAC-HYDRIN Apply  to affected area two (2) times a day. rub in to affected area on feet as needed. atorvastatin 20 mg tablet Commonly known as:  LIPITOR  
TAKE 1 TABLET nightly Blood-Glucose Meter monitoring kit Use as directed  
  
 clotrimazole-betamethasone topical cream  
Commonly known as:  Gracie Holton Apply to left medial shin - pea sized amount or less twice daily for 2 weeks  
  
 cpap machine kit  
by Does Not Apply route. Auto CPAP at Range of 5-15  with heated humidifier. Mask: Mask of choice, please refit. Length of need 99 months. Replace mask and accessories as needed times 12 months. Download data at 30 days and fax at [phone]. Supplies for 99 month  
  
 ergocalciferol 50,000 unit capsule Commonly known as:  ERGOCALCIFEROL Take 1 Cap by mouth every seven (7) days. escitalopram oxalate 20 mg tablet Commonly known as:  Ephraim Calvin Take 1 Tab by mouth daily. FOLIC ACID PO Take 1 Tab by mouth daily. glucose blood VI test strips strip Commonly known as:  ASCENSIA AUTODISC VI, ONE TOUCH ULTRA TEST VI Check fasting and 1 hour after meals as needed. Lancets Misc Use as directed. BRYSON MULTIVITAMIN FOR MEN PO Take  by mouth. SYNTHROID 200 mcg tablet Generic drug:  levothyroxine Take 200 mcg by mouth Daily (before breakfast). triamcinolone acetonide 0.1 % topical cream  
Commonly known as:  KENALOG Apply  to affected area two (2) times daily as needed for Skin Irritation. use thin layer  
  
 vardenafil 20 mg tablet Commonly known as:  LEVITRA Take 20 mg by mouth as needed. VITAMIN B-12 500 mcg tablet Generic drug:  cyanocobalamin Take 1,500 mcg by mouth daily. Prescriptions Sent to Pharmacy Refills  
 atorvastatin (LIPITOR) 20 mg tablet 3 Sig: TAKE 1 TABLET nightly Class: Normal  
 Pharmacy: [facility] [place] Ph #: [phone]  
 ergocalciferol (ERGOCALCIFEROL) 50,000 unit capsule 3 Sig: Take 1 Cap by mouth every seven (7) days. Class: Normal  
 Pharmacy: [id number] [employer], [address] [facility] Ph #: [phone] Route: Oral  
 vardenafil (LEVITRA) 20 mg tablet 5 Sig: Take 20 mg by mouth as needed. Class: Normal  
 Pharmacy: [id number] [address] [facility], [address] [facility] Ph #: [phone] Route: Oral  
  
We Performed the Following AMB POC URINE, MICROALBUMIN, SEMIQUANT (3 RESULTS) [05118 CPT(R)] REFERRAL TO COLON AND RECTAL SURGERY [REF17 Custom] Comments:  
 Please evaluate patient for crcs REFERRAL TO ORTHOPEDICS [EGI252 Custom] Comments:  
 Please evaluate patient for ganglion cyst  
  
Follow-up Instructions Return in about 6 months (around 1/18/2018) for routine care and POC A1C in office. To-Do List   
 07/18/2017 Microbiology:  CULTURE, FUNGUS Referral Information Referral ID Referred By Referred To  
  
 [id number] Wilfredo [facility], [address]   
 [facility] [address] Phone: [phone] Fax: [phone] Visits Status Start Date End Date 1 New Request 7/18/17 7/18/18 If your referral has a status of pending review or denied, additional information will be sent to support the outcome of this decision. Referral ID Referred By Referred To  
 [id number] William MCLAIN, [address] [facility] [address] Phone: [phone] Fax: [phone] Visits Status Start Date End Date 1 New Request 7/18/17 7/18/18 If your referral has a status of pending review or denied, additional information will be sent to support the outcome of this decision. Patient Instructions Learning About Diabetes Food Guidelines Your Care Instructions Meal planning is important to manage diabetes. It helps keep your blood sugar at a target level (which you set with your doctor). You don't have to eat special foods. You can eat what your family eats, including sweets once in a while. But you do have to pay attention to how often you eat and how much you eat of certain foods. You may want to work with a dietitian or a certified diabetes educator (CDE) to help you plan meals and snacks. A dietitian or CDE can also help you lose weight if that is one of your goals. What should you know about eating carbs? Managing the amount of carbohydrate (carbs) you eat is an important part of healthy meals when you have diabetes. Carbohydrate is found in many foods. · Learn which foods have carbs. And learn the amounts of carbs in different foods. ¨ Bread, cereal, pasta, and rice have about 15 grams of carbs in a serving. A serving is 1 slice of bread (1 ounce), ½ cup of cooked cereal, or 1/3 cup of cooked pasta or rice. ¨ Fruits have 15 grams of carbs in a serving.  A serving is 1 small fresh fruit, such as an apple or orange; ½ of a banana; ½ cup of cooked or canned fruit; ½ cup of fruit juice; 1 cup of melon or raspberries; or 2 tablespoons of dried fruit. ¨ Milk and no-sugar-added yogurt have 15 grams of carbs in a serving. A serving is 1 cup of milk or 2/3 cup of no-sugar-added yogurt. ¨ Starchy vegetables have 15 grams of carbs in a serving. A serving is ½ cup of mashed potatoes or sweet potato; 1 cup winter squash; ½ of a small baked potato; ½ cup of cooked beans; or ½ cup cooked corn or green peas. · Learn how much carbs to eat each day and at each meal. A dietitian or CDE can teach you how to keep track of the amount of carbs you eat. This is called carbohydrate counting. · If you are not sure how to count carbohydrate grams, use the Plate Method to plan meals. It is a good, quick way to make sure that you have a balanced meal. It also helps you spread carbs throughout the day. ¨ Divide your plate by types of foods. Put non-starchy vegetables on half the plate, meat or other protein food on one-quarter of the plate, and a grain or starchy vegetable in the final quarter of the plate. To this you can add a small piece of fruit and 1 cup of milk or yogurt, depending on how many carbs you are supposed to eat at a meal. 
· Try to eat about the same amount of carbs at each meal. Do not \"save up\" your daily allowance of carbs to eat at one meal. 
· Proteins have very little or no carbs per serving. Examples of proteins are beef, chicken, turkey, fish, eggs, tofu, cheese, cottage cheese, and peanut butter. A serving size of meat is 3 ounces, which is about the size of a deck of cards. Examples of meat substitute serving sizes (equal to 1 ounce of meat) are 1/4 cup of cottage cheese, 1 egg, 1 tablespoon of peanut butter, and ½ cup of tofu. How can you eat out and still eat healthy? · Learn to estimate the serving sizes of foods that have carbohydrate. If you measure food at home, it will be easier to estimate the amount in a serving of restaurant food. · If the meal you order has too much carbohydrate (such as potatoes, corn, or baked beans), ask to have a low-carbohydrate food instead. Ask for a salad or green vegetables. · If you use insulin, check your blood sugar before and after eating out to help you plan how much to eat in the future. · If you eat more carbohydrate at a meal than you had planned, take a walk or do other exercise. This will help lower your blood sugar. What else should you know? · Limit saturated fat, such as the fat from meat and dairy products. This is a healthy choice because people who have diabetes are at higher risk of heart disease. So choose lean cuts of meat and nonfat or low-fat dairy products. Use olive or canola oil instead of butter or shortening when cooking. · Don't skip meals. Your blood sugar may drop too low if you skip meals and take insulin or certain medicines for diabetes. · Check with your doctor before you drink alcohol. Alcohol can cause your blood sugar to drop too low. Alcohol can also cause a bad reaction if you take certain diabetes medicines. Follow-up care is a key part of your treatment and safety. Be sure to make and go to all appointments, and call your doctor if you are having problems. It's also a good idea to know your test results and keep a list of the medicines you take. Where can you learn more? Go to http://marcela-alex.info/. Enter A920 in the search box to learn more about \"Learning About Diabetes Food Guidelines. \" Current as of: March 13, 2017 Content Version: 11.3 © 1650-6479 [employer], [employer]orporated. Care instructions adapted under license by [employer] (which disclaims liability or warranty for this information). If you have questions about a medical condition or this instruction, always ask your healthcare professional. [address] any warranty or liability for your use of this information. Learning About Meal Planning for Diabetes Why plan your meals? Meal planning can be a key part of managing diabetes. Planning meals and snacks with the right balance of carbohydrate, protein, and fat can help you keep your blood sugar at the target level you set with your doctor. You don't have to eat special foods. You can eat what your family eats, including sweets once in a while. But you do have to pay attention to how often you eat and how much you eat of certain foods. You may want to work with a dietitian or a certified diabetes educator. He or she can give you tips and meal ideas and can answer your questions about meal planning. This health professional can also help you reach a healthy weight if that is one of your goals. What plan is right for you? Your dietitian or diabetes educator may suggest that you start with the plate format or carbohydrate counting. The plate format The plate format is a simple way to help you manage how you eat. You plan meals by learning how much space each food should take on a plate. Using the plate format helps you spread carbohydrate throughout the day. It can make it easier to keep your blood sugar level within your target range. It also helps you see if you're eating healthy portion sizes. To use the plate format, you put non-starchy vegetables on half your plate. Add meat or meat substitutes on one-quarter of the plate. Put a grain or starchy vegetable (such as brown rice or a potato) on the final quarter of the plate. You can add a small piece of fruit and some low-fat or fat-free milk or yogurt, depending on your carbohydrate goal for each meal. 
Here are some tips for using the plate format: · Make sure that you are not using an oversized plate. A 9-inch plate is best. Many restaurants use larger plates. · Get used to using the plate format at home. Then you can use it when you eat out. · Write down your questions about using the plate format.  Talk to your doctor, a dietitian, or a diabetes educator about your concerns. Carbohydrate counting With carbohydrate counting, you plan meals based on the amount of carbohydrate in each food. Carbohydrate raises blood sugar higher and more quickly than any other nutrient. It is found in desserts, breads and cereals, and fruit. It's also found in starchy vegetables such as potatoes and corn, grains such as rice and pasta, and milk and yogurt. Spreading carbohydrate throughout the day helps keep your blood sugar levels within your target range. Your daily amount depends on several things, including your weight, how active you are, which diabetes medicines you take, and what your goals are for your blood sugar levels. A registered dietitian or diabetes educator can help you plan how much carbohydrate to include in each meal and snack. A guideline for your daily amount of carbohydrate is: · 45 to 60 grams at each meal. That's about the same as 3 to 4 carbohydrate servings. · 15 to 20 grams at each snack. That's about the same as 1 carbohydrate serving. The Nutrition Facts label on packaged foods tells you how much carbohydrate is in a serving of the food. First, look at the serving size on the food label. Is that the amount you eat in a serving? All of the nutrition information on a food label is based on that serving size. So if you eat more or less than that, you'll need to adjust the other numbers. Total carbohydrate is the next thing you need to look for on the label. If you count carbohydrate servings, one serving of carbohydrate is 15 grams. For foods that don't come with labels, such as fresh fruits and vegetables, you'll need a guide that lists carbohydrate in these foods. Ask your doctor, dietitian, or diabetes educator about books or other nutrition guides you can use.  
If you take insulin, you need to know how many grams of carbohydrate are in a meal. This lets you know how much rapid-acting insulin to take before you eat. If you use an insulin pump, you get a constant rate of insulin during the day. So the pump must be programmed at meals to give you extra insulin to cover the rise in blood sugar after meals. When you know how much carbohydrate you will eat, you can take the right amount of insulin. Or, if you always use the same amount of insulin, you need to make sure that you eat the same amount of carbohydrate at meals. If you need more help to understand carbohydrate counting and food labels, ask your doctor, dietitian, or diabetes educator. How do you get started with meal planning? Here are some tips to get started: 
· Plan your meals a week at a time. Don't forget to include snacks too. · Use cookbooks or online recipes to plan several main meals. Plan some quick meals for busy nights. You also can double some recipes that freeze well. Then you can save half for other busy nights when you don't have time to cook. · Make sure you have the ingredients you need for your recipes. If you're running low on basic items, put these items on your shopping list too. · List foods that you use to make breakfasts, lunches, and snacks. List plenty of fruits and vegetables. · Post this list on the refrigerator. Add to it as you think of more things you need. · Take the list to the store to do your weekly shopping. Follow-up care is a key part of your treatment and safety. Be sure to make and go to all appointments, and call your doctor if you are having problems. It's also a good idea to know your test results and keep a list of the medicines you take. Where can you learn more? Go to http://marcela-alex.info/. Delaney Ji in the search box to learn more about \"Learning About Meal Planning for Diabetes. \" Current as of: March 13, 2017 Content Version: 11.3 © 9346-4301 [employer], Incorporated.  Care instructions adapted under license by [address] (which disclaims liability or warranty for this information). If you have questions about a medical condition or this instruction, always ask your healthcare professional. [address] any warranty or liability for your use of this information. [facility] : Dr. Brandi Mae [phone] Colorectal Surgery: Dr. Karla Harley [phone] Orthopedics: Dr. Yuniel Martines [phone] Follow up in 6 months for routine care and POC A1C in office Introducing [facility]! Dear Conchita Sorto: Thank you for requesting a Kwaga account. Our records indicate that you already have an active Kwaga account. You can access your account anytime at [email or web address] State Did you know that you can access your hospital and ER discharge instructions at any time in Kwaga? You can also review all of your test results from your hospital stay or ER visit. Additional Information If you have questions, please visit the Frequently Asked Questions section of the Kwaga website at [email or web address] Remember, Kwaga is NOT to be used for urgent needs. For medical emergencies, dial 911. Now available from your iPhone and Android! Please provide this summary of care documentation to your next provider. Your primary care clinician is listed as Sanya Thacker. If you have any questions after today's visit, please call [phone].

## 2017-07-20 ENCOUNTER — HOSPITAL ENCOUNTER (OUTPATIENT)
Dept: LAB | Age: 57
Discharge: HOME | End: 2017-07-20
Payer: COMMERCIAL

## 2017-07-20 DIAGNOSIS — L60.9 NAIL ABNORMALITIES: ICD-10-CM

## 2017-07-20 PROCEDURE — 87102 FUNGUS ISOLATION CULTURE: CPT | Performed by: FAMILY MEDICINE

## 2017-07-20 PROCEDURE — 87077 CULTURE AEROBIC IDENTIFY: CPT | Performed by: FAMILY MEDICINE

## 2017-10-04 LAB
BACTERIA SPEC CULT: ABNORMAL
BACTERIA SPEC CULT: ABNORMAL
FUNGUS SMEAR,FNGSMR: ABNORMAL
SERVICE CMNT-IMP: ABNORMAL

## 2017-10-04 NOTE — PROGRESS NOTES
Please advise the final fungal culture identification returned today. If he is interested in treatment with antifungal pills, we can see him to discuss the schedule. These pills require monthly liver labs and check-ups. His other option is to see a podiatrist to discuss toenail removal of the affected toe (greater toenail).

## 2018-06-18 DIAGNOSIS — E55.9 VITAMIN D DEFICIENCY: ICD-10-CM

## 2018-06-18 RX ORDER — ERGOCALCIFEROL 1.25 MG/1
CAPSULE ORAL
Qty: 12 CAP | Refills: 3 | Status: SHIPPED | OUTPATIENT
Start: 2018-06-18 | End: 2019-05-27 | Stop reason: SDUPTHER

## 2018-08-23 ENCOUNTER — PATIENT MESSAGE (OUTPATIENT)
Dept: FAMILY MEDICINE CLINIC | Age: 58
End: 2018-08-23

## 2018-08-23 DIAGNOSIS — L30.9 ECZEMA, UNSPECIFIED TYPE: ICD-10-CM

## 2018-08-23 RX ORDER — CLOTRIMAZOLE AND BETAMETHASONE DIPROPIONATE 10; .64 MG/G; MG/G
CREAM TOPICAL
Qty: 15 G | Refills: 0 | Status: SHIPPED | OUTPATIENT
Start: 2018-08-23

## 2018-08-23 NOTE — TELEPHONE ENCOUNTER
From: Harish Fonseca  To: Ely Gusman MD  Sent: 8/23/2018 11:11 AM EDT  Subject: Non-Urgent Medical Question    Good morning Dr. Angel Arnold  Im have a rash in the Axilla & testicular area the doesnt go away. Could you recommend anything that could stop this rash, or you like me to to setup an appointment. Here are some pictures.     Sincerely   Asad Powell

## 2018-08-23 NOTE — TELEPHONE ENCOUNTER
He says that the rash is itchy and seems to be related to sweating. Advised on a trial of antifungal / cortisone mix for 7-10 days. If not better, schedule OV.

## 2018-10-29 DIAGNOSIS — L30.9 ECZEMA: ICD-10-CM

## 2018-10-29 DIAGNOSIS — L29.9 ITCHING: Primary | ICD-10-CM

## 2018-10-29 RX ORDER — TRIAMCINOLONE ACETONIDE 1 MG/G
CREAM TOPICAL
Qty: 30 G | Refills: 0 | Status: SHIPPED | OUTPATIENT
Start: 2018-10-29 | End: 2022-08-05 | Stop reason: SDUPTHER

## 2018-12-27 ENCOUNTER — OFFICE VISIT (OUTPATIENT)
Dept: FAMILY MEDICINE CLINIC | Age: 58
End: 2018-12-27

## 2018-12-27 VITALS
OXYGEN SATURATION: 98 % | DIASTOLIC BLOOD PRESSURE: 78 MMHG | BODY MASS INDEX: 31.5 KG/M2 | HEIGHT: 71 IN | RESPIRATION RATE: 14 BRPM | HEART RATE: 77 BPM | SYSTOLIC BLOOD PRESSURE: 122 MMHG | TEMPERATURE: 98 F | WEIGHT: 225 LBS

## 2018-12-27 DIAGNOSIS — R21 RASH: ICD-10-CM

## 2018-12-27 DIAGNOSIS — F41.9 ANXIETY: ICD-10-CM

## 2018-12-27 DIAGNOSIS — E55.9 VITAMIN D DEFICIENCY: ICD-10-CM

## 2018-12-27 DIAGNOSIS — E03.4 HYPOTHYROIDISM DUE TO ACQUIRED ATROPHY OF THYROID: ICD-10-CM

## 2018-12-27 DIAGNOSIS — E78.00 HYPERCHOLESTEROLEMIA: ICD-10-CM

## 2018-12-27 DIAGNOSIS — B35.9 RINGWORM: ICD-10-CM

## 2018-12-27 DIAGNOSIS — E11.8 CONTROLLED TYPE 2 DIABETES MELLITUS WITH COMPLICATION, WITHOUT LONG-TERM CURRENT USE OF INSULIN (HCC): Primary | ICD-10-CM

## 2018-12-27 DIAGNOSIS — Z12.5 PROSTATE CANCER SCREENING: ICD-10-CM

## 2018-12-27 DIAGNOSIS — L29.9 PRURITIC DERMATITIS: ICD-10-CM

## 2018-12-27 RX ORDER — FLUCONAZOLE 150 MG/1
150 TABLET ORAL
Qty: 1 TAB | Refills: 0 | Status: SHIPPED | OUTPATIENT
Start: 2018-12-27 | End: 2018-12-28

## 2018-12-27 NOTE — PROGRESS NOTES
Chief Complaint   Patient presents with    Cholesterol Problem    Diabetes    Thyroid Problem     1. Have you been to the ER, urgent care clinic since your last visit? Hospitalized since your last visit? No    2. Have you seen or consulted any other health care providers outside of the [address] [place] [address] since your last visit? Include any pap smears or colon screening.  No

## 2018-12-27 NOTE — PATIENT INSTRUCTIONS
A Healthy Lifestyle: Care Instructions  Your Care Instructions    A healthy lifestyle can help you feel good, stay at a healthy weight, and have plenty of energy for both work and play. A healthy lifestyle is something you can share with your whole family. A healthy lifestyle also can lower your risk for serious health problems, such as high blood pressure, heart disease, and diabetes. You can follow a few steps listed below to improve your health and the health of your family. Follow-up care is a key part of your treatment and safety. Be sure to make and go to all appointments, and call your doctor if you are having problems. It's also a good idea to know your test results and keep a list of the medicines you take. How can you care for yourself at home? · Do not eat too much sugar, fat, or fast foods. You can still have dessert and treats now and then. The goal is moderation. · Start small to improve your eating habits. Pay attention to portion sizes, drink less juice and soda pop, and eat more fruits and vegetables. ? Eat a healthy amount of food. A 3-ounce serving of meat, for example, is about the size of a deck of cards. Fill the rest of your plate with vegetables and whole grains. ? Limit the amount of soda and sports drinks you have every day. Drink more water when you are thirsty. ? Eat at least 5 servings of fruits and vegetables every day. It may seem like a lot, but it is not hard to reach this goal. A serving or helping is 1 piece of fruit, 1 cup of vegetables, or 2 cups of leafy, raw vegetables. Have an apple or some carrot sticks as an afternoon snack instead of a candy bar. Try to have fruits and/or vegetables at every meal.  · Make exercise part of your daily routine. You may want to start with simple activities, such as walking, bicycling, or slow swimming. Try to be active 30 to 60 minutes every day. You do not need to do all 30 to 60 minutes all at once.  For example, you can exercise 3 times a day for 10 or 20 minutes. Moderate exercise is safe for most people, but it is always a good idea to talk to your doctor before starting an exercise program.  · Keep moving. [place] the lawn, work in the garden, or [place]. Take the stairs instead of the elevator at work. · If you smoke, quit. People who smoke have an increased risk for heart attack, stroke, cancer, and other lung illnesses. Quitting is hard, but there are ways to boost your chance of quitting tobacco for good. ? Use nicotine gum, patches, or lozenges. ? Ask your doctor about stop-smoking programs and medicines. ? Keep trying. In addition to reducing your risk of diseases in the future, you will notice some benefits soon after you stop using tobacco. If you have shortness of breath or asthma symptoms, they will likely get better within a few weeks after you quit. · Limit how much alcohol you drink. Moderate amounts of alcohol (up to 2 drinks a day for men, 1 drink a day for women) are okay. But drinking too much can lead to liver problems, high blood pressure, and other health problems. Family health  If you have a family, there are many things you can do together to improve your health. · Eat meals together as a family as often as possible. · Eat healthy foods. This includes fruits, vegetables, lean meats and dairy, and whole grains. · Include your family in your fitness plan. Most people think of activities such as jogging or tennis as the way to fitness, but there are many ways you and your family can be more active. Anything that makes you breathe hard and gets your heart pumping is exercise. Here are some tips:  ? Walk to do errands or to take your child to school or the bus.  ? Go for a family bike ride after dinner instead of watching TV. Where can you learn more? Go to http://marcela-alex.info/. Enter G865 in the search box to learn more about \"A Healthy Lifestyle: Care Instructions. \"  Current as of: December 7, 2017  Content Version: 11.8  © 1516-9597 Healthwise, Incorporated. Care instructions adapted under license by [employer] (which disclaims liability or warranty for this information). If you have questions about a medical condition or this instruction, always ask your healthcare professional. [address] any warranty or liability for your use of this information.

## 2018-12-28 NOTE — PROGRESS NOTES
SUBJECTIVE Chief Complaint Patient presents with  Cholesterol Problem  Diabetes  Thyroid Problem Here for follow-up. He is a bit overdue and wants to get his updated labs ordered. He also has a few different rashes he is concerned about. He is diabetic based on his last set of labs. He is seeing an endocrinologist for care of his hypothyroidism. We also reviewed their cardiac risk factors. Denies any blurred vision. Denies any polyuria, polydipsia, or polyphagia. He is on a ketogenic diet and reports some weight loss. He is not currently checking blood sugars. History of low vit D - Currently on vitamin D daily. Hypercholesterolemia - Taking Lipitor 20mg. No myalgias or cramping. Anxiety - well-controlled on Lexapro. No complaints. No harmful ideaiton. Patient presents complaining three separate dermatological issues. He has had recurrent 2-3mm circular dry spots on his lower legs. He uses corticosteroid topicals for a few days and they resolve. They get dry and then he can peel them off. They are not itchy. Also has a rash on his lower pubic area. It is itchy and sometimes smells fishy. He has tried various creams - corticosteroid cream dose not work. He tried a foot fungal spray a few times without relief as well.  rash. This has been going on for months. He also reports his bilateral axilla intermittently itch. He uses triamcinolone which seems to help. The itching is intermittent in episodes. ROS: 
History obtained from the patient · General: negative for - chills, fever, weight changes or malaise · Respiratory: no cough, shortness of breath, or wheezing · Cardiovascular: no chest pain, palpitations, or dyspnea on exertion · Gastrointestinal: no abdominal pain, N/V, change in bowel habits, or black or bloody stools · Neurological: no numbness, tingling, headache or dizziness OBJECTIVE 
 Blood pressure 122/78, pulse 77, temperature 98 °F (36.7 °C), temperature source Oral, resp. rate 14, height 5' 11\" (1.803 m), weight 225 lb (102.1 kg), SpO2 98 %. General:  alert, cooperative, well appearing, in no apparent distress. CV:  The heart sounds are regular in rate and rhythm. There is a normal S1 and S2. There or no murmurs. Lungs: Inspiratory and expiratory efforts are full and unlabored. Lung sounds are clear and equal to auscultation throughout all lung fields without wheezing, rales, or rhonchi. Extremities: There is no clubbing, cyanosis, or edema. Skin: Axilla without rashes. Pubic area with a few irregularly bordered erythematous patches with central clearing and slightly dry appearing. His legs have a few scattered ashy spots that can be peeled off. They appear near follicles. Neuro:  Monofilament testing is intact. Gait is normal. 
Psych: normal affect. Mood good. Oriented x 3. Judgement and insight intact. ASSESSMENT / PLAN 
  ICD-10-CM ICD-9-CM 1. Controlled type 2 diabetes mellitus with complication, without long-term current use of insulin (HCC) E11.8 250.90 CBC W/O DIFF  
   METABOLIC PANEL, COMPREHENSIVE  
   LIPID PANEL  
   HEMOGLOBIN A1C W/O EAG  
   MICROALBUMIN, UR, RAND W/ MICROALB/CREAT RATIO 2. Hypercholesterolemia E78.00 272.0 3. Vitamin D deficiency E55.9 268.9 VITAMIN D, 25 HYDROXY 4. Anxiety F41.9 300.00   
5. Hypothyroidism due to acquired atrophy of thyroid E03.4 244.8   
  246.8 6. Rash R21 782.1 7. Ringworm B35.9 110.9 8. Pruritic dermatitis L29.9 698.9 9. Prostate cancer screening Z12.5 V76.44 PSA W/ REFLX FREE PSA Diabetes  - Needs updated labs. Advised on diabetic diet guidelines. Cont ketogenic diet. Check feet daily. He is followed by endocrinology as well. Check eyes annually for diabetic retinopathy. Hypercholesterolemia - Continue current care. Check lipids and LFTs. Refills as needed. Hypovitaminosis D -  Continue current daily regimen. Check vit D levels. Hypothyroidism -  continue Synthroid per endocrinology. Anxiety - Continue Lexapro. Doing well. Rash on legs - this may be a self limited follicular issue not requiring any creams. He will stop wearing long socks for a month to see if this helps. Ringworm - he will take one dose of diflucan. He will skip his Lexapro on the day he takes that which he says he does from time to time anyway. Topical antifungal for 2 weeks. Pruritis of axilla - trial of a daily allergy pill like claritin. Can use triamcinolone in short episodes. Advised against longterm use of steroid creams in same area. Patient understands our medical plan. Patient has provided input and agrees with goals. Alternatives have been explained and offered. Risks/benefits and significant side effects of medications have been reviewed. All questions answered. The patient is to call if condition worsens or fails to improve. Follow-up Disposition: 
Return in about 6 months (around 6/27/2019) for routine care. A1c to be done at next visit. Fasting labs are due at your earliest convenience .

## 2019-01-03 ENCOUNTER — HOSPITAL ENCOUNTER (OUTPATIENT)
Dept: LAB | Age: 59
Discharge: HOME | End: 2019-01-03
Payer: COMMERCIAL

## 2019-01-03 DIAGNOSIS — E11.8 CONTROLLED TYPE 2 DIABETES MELLITUS WITH COMPLICATION, WITHOUT LONG-TERM CURRENT USE OF INSULIN (HCC): ICD-10-CM

## 2019-01-03 DIAGNOSIS — Z12.5 PROSTATE CANCER SCREENING: ICD-10-CM

## 2019-01-03 DIAGNOSIS — E55.9 VITAMIN D DEFICIENCY: ICD-10-CM

## 2019-01-03 LAB
25(OH)D3 SERPL-MCNC: 93.9 NG/ML (ref 30–100)
ALBUMIN SERPL-MCNC: 3.6 G/DL (ref 3.4–5)
ALBUMIN/GLOB SERPL: 0.9 {RATIO} (ref 0.8–1.7)
ALP SERPL-CCNC: 83 U/L (ref 45–117)
ALT SERPL-CCNC: 27 U/L (ref 16–61)
ANION GAP SERPL CALC-SCNC: 7 MMOL/L (ref 3–18)
AST SERPL-CCNC: 19 U/L (ref 15–37)
BILIRUB SERPL-MCNC: 0.5 MG/DL (ref 0.2–1)
BUN SERPL-MCNC: 15 MG/DL (ref 7–18)
BUN/CREAT SERPL: 17 (ref 12–20)
CALCIUM SERPL-MCNC: 8.4 MG/DL (ref 8.5–10.1)
CHLORIDE SERPL-SCNC: 107 MMOL/L (ref 100–108)
CHOLEST SERPL-MCNC: 179 MG/DL
CO2 SERPL-SCNC: 27 MMOL/L (ref 21–32)
CREAT SERPL-MCNC: 0.89 MG/DL (ref 0.6–1.3)
CREAT UR-MCNC: 314 MG/DL (ref 30–125)
ERYTHROCYTE [DISTWIDTH] IN BLOOD BY AUTOMATED COUNT: 13.3 % (ref 11.6–14.5)
GLOBULIN SER CALC-MCNC: 4 G/DL (ref 2–4)
GLUCOSE SERPL-MCNC: 100 MG/DL (ref 74–99)
HBA1C MFR BLD: 6.2 % (ref 4.2–5.6)
HCT VFR BLD AUTO: 43.9 % (ref 36–48)
HDLC SERPL-MCNC: 40 MG/DL (ref 40–60)
HDLC SERPL: 4.5 {RATIO} (ref 0–5)
HGB BLD-MCNC: 14.9 G/DL (ref 13–16)
LDLC SERPL CALC-MCNC: 104.6 MG/DL (ref 0–100)
LIPID PROFILE,FLP: ABNORMAL
MCH RBC QN AUTO: 28.2 PG (ref 24–34)
MCHC RBC AUTO-ENTMCNC: 33.9 G/DL (ref 31–37)
MCV RBC AUTO: 83 FL (ref 74–97)
MICROALBUMIN UR-MCNC: 2.11 MG/DL (ref 0–3)
MICROALBUMIN/CREAT UR-RTO: 7 MG/G (ref 0–30)
PLATELET # BLD AUTO: 207 K/UL (ref 135–420)
PMV BLD AUTO: 10.7 FL (ref 9.2–11.8)
POTASSIUM SERPL-SCNC: 4.1 MMOL/L (ref 3.5–5.5)
PROT SERPL-MCNC: 7.6 G/DL (ref 6.4–8.2)
RBC # BLD AUTO: 5.29 M/UL (ref 4.7–5.5)
SODIUM SERPL-SCNC: 141 MMOL/L (ref 136–145)
TRIGL SERPL-MCNC: 172 MG/DL (ref ?–150)
VLDLC SERPL CALC-MCNC: 34.4 MG/DL
WBC # BLD AUTO: 5.5 K/UL (ref 4.6–13.2)

## 2019-01-03 PROCEDURE — 83036 HEMOGLOBIN GLYCOSYLATED A1C: CPT

## 2019-01-03 PROCEDURE — 84153 ASSAY OF PSA TOTAL: CPT

## 2019-01-03 PROCEDURE — 82306 VITAMIN D 25 HYDROXY: CPT

## 2019-01-03 PROCEDURE — 85027 COMPLETE CBC AUTOMATED: CPT

## 2019-01-03 PROCEDURE — 80061 LIPID PANEL: CPT

## 2019-01-03 PROCEDURE — 36415 COLL VENOUS BLD VENIPUNCTURE: CPT

## 2019-01-03 PROCEDURE — 80053 COMPREHEN METABOLIC PANEL: CPT

## 2019-01-03 PROCEDURE — 82043 UR ALBUMIN QUANTITATIVE: CPT

## 2019-01-04 LAB
PSA SERPL-MCNC: 1.3 NG/ML (ref 0–4)
REFLEX CRITERIA: NORMAL

## 2019-04-08 ENCOUNTER — HOSPITAL ENCOUNTER (EMERGENCY)
Age: 59
Discharge: HOME | End: 2019-04-08
Attending: EMERGENCY MEDICINE | Admitting: EMERGENCY MEDICINE
Payer: COMMERCIAL

## 2019-04-08 ENCOUNTER — APPOINTMENT (OUTPATIENT)
Dept: ULTRASOUND IMAGING | Age: 59
End: 2019-04-08
Attending: EMERGENCY MEDICINE
Payer: COMMERCIAL

## 2019-04-08 VITALS
OXYGEN SATURATION: 98 % | RESPIRATION RATE: 16 BRPM | SYSTOLIC BLOOD PRESSURE: 132 MMHG | DIASTOLIC BLOOD PRESSURE: 75 MMHG | HEIGHT: 71 IN | WEIGHT: 205 LBS | TEMPERATURE: 98.6 F | HEART RATE: 75 BPM | BODY MASS INDEX: 28.7 KG/M2

## 2019-04-08 DIAGNOSIS — N50.811 PAIN IN RIGHT TESTICLE: Primary | ICD-10-CM

## 2019-04-08 LAB
APPEARANCE UR: CLEAR
BILIRUB UR QL: NEGATIVE
COLOR UR: YELLOW
GLUCOSE UR STRIP.AUTO-MCNC: NEGATIVE MG/DL
HGB UR QL STRIP: NEGATIVE
KETONES UR QL STRIP.AUTO: NEGATIVE MG/DL
LEUKOCYTE ESTERASE UR QL STRIP.AUTO: NEGATIVE
NITRITE UR QL STRIP.AUTO: NEGATIVE
PH UR STRIP: 6 [PH] (ref 5–8)
PROT UR STRIP-MCNC: NEGATIVE MG/DL
SP GR UR REFRACTOMETRY: 1.02 (ref 1–1.03)
UROBILINOGEN UR QL STRIP.AUTO: 1 EU/DL (ref 0.2–1)

## 2019-04-08 PROCEDURE — 76870 US EXAM SCROTUM: CPT

## 2019-04-08 PROCEDURE — 99283 EMERGENCY DEPT VISIT LOW MDM: CPT

## 2019-04-08 PROCEDURE — 81003 URINALYSIS AUTO W/O SCOPE: CPT

## 2019-04-08 PROCEDURE — 87491 CHLMYD TRACH DNA AMP PROBE: CPT

## 2019-04-08 RX ORDER — DOXYCYCLINE 100 MG/1
100 CAPSULE ORAL 2 TIMES DAILY
Qty: 20 CAP | Refills: 0 | Status: SHIPPED | OUTPATIENT
Start: 2019-04-08 | End: 2019-04-18

## 2019-04-09 ENCOUNTER — OFFICE VISIT (OUTPATIENT)
Dept: FAMILY MEDICINE CLINIC | Age: 59
End: 2019-04-09

## 2019-04-09 VITALS
RESPIRATION RATE: 16 BRPM | WEIGHT: 238 LBS | TEMPERATURE: 98.3 F | DIASTOLIC BLOOD PRESSURE: 84 MMHG | HEART RATE: 76 BPM | SYSTOLIC BLOOD PRESSURE: 138 MMHG | BODY MASS INDEX: 33.32 KG/M2 | HEIGHT: 71 IN | OXYGEN SATURATION: 97 %

## 2019-04-09 DIAGNOSIS — G58.8 NEURALGIA OF RIGHT PUDENDAL NERVE: ICD-10-CM

## 2019-04-09 DIAGNOSIS — N50.819 TESTICLE PAIN: Primary | ICD-10-CM

## 2019-04-09 RX ORDER — MINOCYCLINE HYDROCHLORIDE 100 MG/1
CAPSULE ORAL
Refills: 0 | COMMUNITY
Start: 2019-04-02 | End: 2020-03-23 | Stop reason: ALTCHOICE

## 2019-04-09 NOTE — DISCHARGE INSTRUCTIONS
Patient Education        Testicular Pain: Care Instructions  Your Care Instructions    Pain in the testicles can be caused by many things. These include an injury to your testicles, an infection, and testicular torsion. Injuries and genital problems most often happen during sports or recreational activities, at work, or in a fall. Pain caused by an injury usually goes away quickly. There is usually no long-term harm to your testicles. Infections that may cause pain include:  · An infection of the testicles. This is called orchitis. · An abscess in the scrotum or testicles. · Some sexually transmitted infections (STIs). · A swelling of the tube attached to a testicle. This swelling is called epididymitis. It can cause pain and is sometimes caused by an infection. Testicular torsion happens when a testicle twists on the spermatic cord. This cuts off the blood supply to the testicle. This is a serious condition that requires surgery. Follow-up care is a key part of your treatment and safety. Be sure to make and go to all appointments, and call your doctor if you are having problems. It's also a good idea to know your test results and keep a list of the medicines you take. How can you care for yourself at home? · Rest and protect your testicles and groin. Stop, change, or take a break from any activity that may be causing your pain or soreness. · Put ice or a cold pack on the area for 10 to 20 minutes at a time. Put a thin cloth between the ice and your skin. · Wear briefs, not boxers. Briefs help support the injured area. You can use a jock strap if it helps relieve your pain. · If your doctor prescribed antibiotics, take them as directed. Do not stop taking them just because you feel better. You need to take the full course of antibiotics. · Ask your doctor if you can take an over-the-counter pain medicine, such as acetaminophen (Tylenol), ibuprofen (Advil, Motrin), or naproxen (Aleve).  Be safe with medicines. Read and follow all instructions on the label. · If the doctor gave you a prescription medicine for pain, take it as prescribed. When should you call for help? Call your doctor now or seek immediate medical care if:    · You have severe or increasing pain.     · You notice a change in how your testicles look or are positioned in your scrotum.     · You notice new or worse swelling in your scrotum.     · You have symptoms of a urinary problem, such as a urinary tract infection. These may include:  ? Pain or burning when you urinate. ? A frequent need to urinate without being able to pass much urine. ? Pain in the flank, which is just below the rib cage and above the waist on either side of the back. ? Blood in your urine. ? A fever.    Watch closely for changes in your health, and be sure to contact your doctor if:    · You do not get better as expected. Where can you learn more? Go to http://marcela-alex.info/. Enter M046 in the search box to learn more about \"Testicular Pain: Care Instructions. \"  Current as of: March 20, 2018  Content Version: 11.9  © 9168-1532 [employer]. Care instructions adapted under license by [employer] (which disclaims liability or warranty for this information). If you have questions about a medical condition or this instruction, always ask your healthcare professional. [place] [address] any warranty or liability for your use of this information.

## 2019-04-09 NOTE — ED PROVIDER NOTES
HPI patient complains of a 3-4-day history of right testicular pain. No dysuria or hematuria. No groin mass. He says he thinks he hit his testicle and this resulted in the pain but is not quite sure. No abdominal pain no nausea or vomiting. Denies any other symptoms or complaints. Past Medical History:  
Diagnosis Date  Allergic rhinitis 3/27/2010  Anxiety  Arthritis  Diabetes ([address]  Dishydrosis  Dyslipidemia  Echocardiogram 09/17/10 EF 60-65%. RVSP 15-20 mmHg. PUJA  
 ED (erectile dysfunction)  Hashimoto thyroiditis   
 intermittently occurs  Hx of colonoscopy 07/01/2005 [facility]/Normal; grade 1-to grade 2 internal hemorrhoids  Hypothyroidism 1983 Dr. Diandra López  Hypovitaminosis D  Thallium stress test 09/17/10 Neg stress test.  Ex. time 11:12.  
 Unspecified sleep apnea Past Surgical History:  
Procedure Laterality Date  COLONOSCOPY  2005  
 early due to bloody stool; next colonoscopy due [address]  HX LAP CHOLECYSTECTOMY  9/2014  HX TONSILLECTOMY    
 as a child Family History:  
Problem Relation Age of Onset  Diabetes Mother  Diabetes Father  Heart Disease Father  Diabetes Paternal Aunt  Diabetes Paternal Uncle  Diabetes Paternal Grandmother  Diabetes Paternal Grandfather  Thyroid Disease Brother  Arthritis-osteo Brother Social History Socioeconomic History  Marital status:  Spouse name: Not on file  Number of children: Not on file  Years of education: Not on file  Highest education level: Not on file Occupational History  Occupation: retail Social Needs  Financial resource strain: Not on file  Food insecurity:  
  Worry: Not on file Inability: Not on file  Transportation needs:  
  Medical: Not on file Non-medical: Not on file Tobacco Use  Smoking status: Former Smoker Packs/day: 0.30 Years: 9.00 Pack years: 2.70 Types: Cigarettes  Smokeless tobacco: Never Used Substance and Sexual Activity  Alcohol use: No  
 Drug use: No  
 Sexual activity: Yes  
  Partners: Female Lifestyle  Physical activity:  
  Days per week: Not on file Minutes per session: Not on file  Stress: Not on file Relationships  Social connections:  
  Talks on phone: Not on file Gets together: Not on file Attends Worship service: Not on file Active member of club or organization: Not on file Attends meetings of clubs or organizations: Not on file Relationship status: Not on file  Intimate partner violence:  
  Fear of current or ex partner: Not on file Emotionally abused: Not on file Physically abused: Not on file Forced sexual activity: Not on file Other Topics Concern  Not on file Social History Narrative  Not on file ALLERGIES: Patient has no known allergies. Review of Systems Constitutional: Negative. HENT: Negative. Respiratory: Negative. Cardiovascular: Negative. Gastrointestinal: Negative. Genitourinary: Positive for testicular pain. Musculoskeletal: Negative. Neurological: Negative. Psychiatric/Behavioral: Negative. Vitals:  
 04/08/19 1939 BP: 135/77 Pulse: 76 Resp: 16 Temp: 98.6 °F (37 °C) SpO2: 98% Weight: 93 kg (205 lb) Height: 5' 11\" (1.803 m) Physical Exam  
Constitutional: He is oriented to person, place, and time. He appears well-developed and well-nourished. HENT:  
Head: Normocephalic and atraumatic. Mouth/Throat: Oropharynx is clear and moist.  
Eyes: Pupils are equal, round, and reactive to light. EOM are normal.  
Neck: Neck supple. Cardiovascular: Normal rate and regular rhythm. Pulmonary/Chest: Effort normal and breath sounds normal.  
Abdominal: Soft.   
Genitourinary: Penis normal.  
 Genitourinary Comments: No groin rashes. No hernia. Both testes are descended bilaterally. No urethral drainage or discharge. Right testicle is mildly tender over the epididymis and the right inguinal ligament. Left testicle is nontender. No scrotal masses are noted. Musculoskeletal: Normal range of motion. Neurological: He is alert and oriented to person, place, and time. Skin: Skin is warm and dry. Psychiatric: He has a normal mood and affect. Nursing note and vitals reviewed. MDM Number of Diagnoses or Management Options Pain in right testicle:  
 
 Patient understands and agrees with disposition and follow-up plan. Shanthi Pratt MD 9:52 PM 
 
Procedures

## 2019-04-09 NOTE — PROGRESS NOTES
1. Have you been to the ER, urgent care clinic since your last visit? Hospitalized since your last visit? Yes Where: [facility]. Have you seen or consulted any other health care providers outside of the [address] [place] [address] since your last visit? Include any pap smears or colon screening. Yes, [facility]

## 2019-04-09 NOTE — PATIENT INSTRUCTIONS
A Healthy Lifestyle: Care Instructions Your Care Instructions A healthy lifestyle can help you feel good, stay at a healthy weight, and have plenty of energy for both work and play. A healthy lifestyle is something you can share with your whole family. A healthy lifestyle also can lower your risk for serious health problems, such as high blood pressure, heart disease, and diabetes. You can follow a few steps listed below to improve your health and the health of your family. Follow-up care is a key part of your treatment and safety. Be sure to make and go to all appointments, and call your doctor if you are having problems. It's also a good idea to know your test results and keep a list of the medicines you take. How can you care for yourself at home? · Do not eat too much sugar, fat, or fast foods. You can still have dessert and treats now and then. The goal is moderation. · Start small to improve your eating habits. Pay attention to portion sizes, drink less juice and soda pop, and eat more fruits and vegetables. ? Eat a healthy amount of food. A 3-ounce serving of meat, for example, is about the size of a deck of cards. Fill the rest of your plate with vegetables and whole grains. ? Limit the amount of soda and sports drinks you have every day. Drink more water when you are thirsty. ? Eat at least 5 servings of fruits and vegetables every day. It may seem like a lot, but it is not hard to reach this goal. A serving or helping is 1 piece of fruit, 1 cup of vegetables, or 2 cups of leafy, raw vegetables. Have an apple or some carrot sticks as an afternoon snack instead of a candy bar. Try to have fruits and/or vegetables at every meal. 
· Make exercise part of your daily routine. You may want to start with simple activities, such as walking, bicycling, or slow swimming. Try to be active 30 to 60 minutes every day.  You do not need to do all 30 to 60 minutes all at once. For example, you can exercise 3 times a day for 10 or 20 minutes. Moderate exercise is safe for most people, but it is always a good idea to talk to your doctor before starting an exercise program. 
· Keep moving. [place] the lawn, work in the garden, or [place]. Take the stairs instead of the elevator at work. · If you smoke, quit. People who smoke have an increased risk for heart attack, stroke, cancer, and other lung illnesses. Quitting is hard, but there are ways to boost your chance of quitting tobacco for good. ? Use nicotine gum, patches, or lozenges. ? Ask your doctor about stop-smoking programs and medicines. ? Keep trying. In addition to reducing your risk of diseases in the future, you will notice some benefits soon after you stop using tobacco. If you have shortness of breath or asthma symptoms, they will likely get better within a few weeks after you quit. · Limit how much alcohol you drink. Moderate amounts of alcohol (up to 2 drinks a day for men, 1 drink a day for women) are okay. But drinking too much can lead to liver problems, high blood pressure, and other health problems. Family health If you have a family, there are many things you can do together to improve your health. · Eat meals together as a family as often as possible. · Eat healthy foods. This includes fruits, vegetables, lean meats and dairy, and whole grains. · Include your family in your fitness plan. Most people think of activities such as jogging or tennis as the way to fitness, but there are many ways you and your family can be more active. Anything that makes you breathe hard and gets your heart pumping is exercise. Here are some tips: 
? Walk to do errands or to take your child to school or the bus. 
? Go for a family bike ride after dinner instead of watching TV. Where can you learn more? Go to http://marcela-alex.info/. Enter S607 in the search box to learn more about \"A Healthy Lifestyle: Care Instructions. \" Current as of: September 11, 2018 Content Version: 11.9 © 9638-4041 [employer]. Care instructions adapted under license by [employer] (which disclaims liability or warranty for this information). If you have questions about a medical condition or this instruction, always ask your healthcare professional. [place] [address] any warranty or liability for your use of this information.

## 2019-04-09 NOTE — ED NOTES
Yue Rollins is a 64 y.o. male that was discharged in stable. Pt was accompanied by self. Pt is driving. The patients diagnosis, condition and treatment were explained to  patient and aftercare instructions were given. The patient verbalized understanding. Patient armband removed and shredded.

## 2019-04-09 NOTE — PROGRESS NOTES
Chief Complaint Patient presents with  Groin Pain HVED x 1 day ago for right testicular pain; u/s done and was WNL; possible pulled muscle; pain present x 4 days SUBJECTIVE Patient presents for right testicular and groin pains. HPI patient complains of a 4-5 day history of right testicular pain. He has also had pain in his right groin. It is constant and rated a 5/10. It is not tender to touch. No dysuria, discharge, or hematuria. No groin mass. He did take a 4-5 hour bike ride to [place] and back around the time of onset (or maybe a day or two prior). No abdominal pain no nausea or vomiting. Denies any other symptoms or complaints. He is on minocycline for an axillary rash. He was prescribed doxy by the ER but he has not started it due to the derm medication. He has no new partners. He had a GC/chlam performed in the ER but results are pending. He went to the ER and had a normal U/A and ultrasound (results below). OBJECTIVE Pulse 76, temperature 98.3 °F (36.8 °C), temperature source Oral, resp. rate 16, height 5' 11\" (1.803 m), weight 238 lb (108 kg), SpO2 97 %. General:  Alert, cooperative, well appearing, in no apparent distress. Abd: soft, nontender. No hernias. Groin: Nontender. No lumps. No LAD. No hernias. Result Information Status: Final result (Exam End: 4/8/2019 20:59) Provider Status: Open Study Result EXAM: Ultrasound of the Scrotum  
  
INDICATION: Right testicular pain 
  
TECHNIQUE: Ultrasound of the scrotum performed with gray scale and color Doppler. 
  
COMPARISON: None 
  
FINDINGS: 
  
The right testicle measures 2.6 x 3.9 x 5.4 cm. The echotexture is unremarkable. No mass lesion identified. The internal vascularity is unremarkable. The 
epididymis is grossly unremarkable. Trace hydrocele is noted. 
  
The left testicle measures 2.7 x 3.3 x 4.8 cm. The echotexture is unremarkable. No mass lesion identified. The internal vascularity is preserved. The 
epididymis is grossly unremarkable.  
  
IMPRESSION IMPRESSION: 
1. No acute pathology appreciated in either testes.  
  
2. Trace hydrocele on the right. 
   
 
ASSESSMENT / PLAN 
  ICD-10-CM ICD-9-CM 1. Testicle pain N50.819 608.9 2. Neuralgia of right pudendal nerve G58.8 355.8 This may simply represent a pudendal nerve irritation from his ride. For now he will start a course of doxy per the ER. During that time, he will hold the minocycline. I think this will basically take time to calm down. Avoid riding motorcycle for now. Sit on softer surfaces. 15 minutes of face to face time spent with the patient with at least 50% on counseling on above medical issues. All chart history elements were reviewed by me at the time of the visit even though marked at time of note closure. Patient understands our medical plan. Patient has provided input and agrees with goals. Alternatives have been explained and offered. All questions answered. The patient is to call if condition worsens or fails to improve. Follow-up and Dispositions · Return Thursday, June 27, 2019 02:30 PM routine care .

## 2019-04-11 LAB
C TRACH RRNA SPEC QL NAA+PROBE: NEGATIVE
N GONORRHOEA RRNA SPEC QL NAA+PROBE: NEGATIVE
SPECIMEN SOURCE: NORMAL
T VAGINALIS RRNA VAG QL NAA+PROBE: NEGATIVE

## 2019-05-27 DIAGNOSIS — E55.9 VITAMIN D DEFICIENCY: ICD-10-CM

## 2019-06-09 RX ORDER — ERGOCALCIFEROL 1.25 MG/1
CAPSULE ORAL
Qty: 12 CAP | Refills: 3 | Status: SHIPPED | OUTPATIENT
Start: 2019-06-09 | End: 2020-10-26

## 2019-08-06 ENCOUNTER — OFFICE VISIT (OUTPATIENT)
Dept: FAMILY MEDICINE CLINIC | Age: 59
End: 2019-08-06

## 2019-08-06 VITALS
BODY MASS INDEX: 33.74 KG/M2 | HEIGHT: 71 IN | HEART RATE: 67 BPM | RESPIRATION RATE: 16 BRPM | DIASTOLIC BLOOD PRESSURE: 84 MMHG | OXYGEN SATURATION: 98 % | SYSTOLIC BLOOD PRESSURE: 122 MMHG | WEIGHT: 241 LBS | TEMPERATURE: 98 F

## 2019-08-06 DIAGNOSIS — E03.4 HYPOTHYROIDISM DUE TO ACQUIRED ATROPHY OF THYROID: ICD-10-CM

## 2019-08-06 DIAGNOSIS — E66.9 OBESITY, CLASS I, BMI 30-34.9: ICD-10-CM

## 2019-08-06 DIAGNOSIS — E11.8 CONTROLLED TYPE 2 DIABETES MELLITUS WITH COMPLICATION, WITHOUT LONG-TERM CURRENT USE OF INSULIN (HCC): ICD-10-CM

## 2019-08-06 DIAGNOSIS — M79.89 SWELLING OF RIGHT LOWER EXTREMITY: ICD-10-CM

## 2019-08-06 DIAGNOSIS — R00.2 PALPITATIONS: Primary | ICD-10-CM

## 2019-08-06 DIAGNOSIS — M79.651 PAIN IN RIGHT THIGH: ICD-10-CM

## 2019-08-06 LAB — HBA1C MFR BLD HPLC: 6 %

## 2019-08-06 NOTE — PROGRESS NOTES
1. Have you been to the ER, urgent care clinic since your last visit? Hospitalized since your last visit? No    2. Have you seen or consulted any other health care providers outside of the [address] [place] [address] since your last visit? Include any pap smears or colon screening.  No

## 2019-08-06 NOTE — PATIENT INSTRUCTIONS
A Healthy Lifestyle: Care Instructions Your Care Instructions A healthy lifestyle can help you feel good, stay at a healthy weight, and have plenty of energy for both work and play. A healthy lifestyle is something you can share with your whole family. A healthy lifestyle also can lower your risk for serious health problems, such as high blood pressure, heart disease, and diabetes. You can follow a few steps listed below to improve your health and the health of your family. Follow-up care is a key part of your treatment and safety. Be sure to make and go to all appointments, and call your doctor if you are having problems. It's also a good idea to know your test results and keep a list of the medicines you take. How can you care for yourself at home? · Do not eat too much sugar, fat, or fast foods. You can still have dessert and treats now and then. The goal is moderation. · Start small to improve your eating habits. Pay attention to portion sizes, drink less juice and soda pop, and eat more fruits and vegetables. ? Eat a healthy amount of food. A 3-ounce serving of meat, for example, is about the size of a deck of cards. Fill the rest of your plate with vegetables and whole grains. ? Limit the amount of soda and sports drinks you have every day. Drink more water when you are thirsty. ? Eat at least 5 servings of fruits and vegetables every day. It may seem like a lot, but it is not hard to reach this goal. A serving or helping is 1 piece of fruit, 1 cup of vegetables, or 2 cups of leafy, raw vegetables. Have an apple or some carrot sticks as an afternoon snack instead of a candy bar. Try to have fruits and/or vegetables at every meal. 
· Make exercise part of your daily routine. You may want to start with simple activities, such as walking, bicycling, or slow swimming. Try to be active 30 to 60 minutes every day.  You do not need to do all 30 to 60 minutes all at once. For example, you can exercise 3 times a day for 10 or 20 minutes. Moderate exercise is safe for most people, but it is always a good idea to talk to your doctor before starting an exercise program. 
· Keep moving. George Choi the lawn, work in the garden, or [employer]. Take the stairs instead of the elevator at work. · If you smoke, quit. People who smoke have an increased risk for heart attack, stroke, cancer, and other lung illnesses. Quitting is hard, but there are ways to boost your chance of quitting tobacco for good. ? Use nicotine gum, patches, or lozenges. ? Ask your doctor about stop-smoking programs and medicines. ? Keep trying. In addition to reducing your risk of diseases in the future, you will notice some benefits soon after you stop using tobacco. If you have shortness of breath or asthma symptoms, they will likely get better within a few weeks after you quit. · Limit how much alcohol you drink. Moderate amounts of alcohol (up to 2 drinks a day for men, 1 drink a day for women) are okay. But drinking too much can lead to liver problems, high blood pressure, and other health problems. Family health If you have a family, there are many things you can do together to improve your health. · Eat meals together as a family as often as possible. · Eat healthy foods. This includes fruits, vegetables, lean meats and dairy, and whole grains. · Include your family in your fitness plan. Most people think of activities such as jogging or tennis as the way to fitness, but there are many ways you and your family can be more active. Anything that makes you breathe hard and gets your heart pumping is exercise. Here are some tips: 
? Walk to do errands or to take your child to school or the bus. 
? Go for a family bike ride after dinner instead of watching TV. Where can you learn more? Go to http://marcela-alex.info/. Enter M695 in the search box to learn more about \"A Healthy Lifestyle: Care Instructions. \" Current as of: September 11, 2018 Content Version: 12.1 © 6158-3918 Healthwise[employer]. Care instructions adapted under license by [employer] (which disclaims liability or warranty for this information). If you have questions about a medical condition or this instruction, always ask your healthcare professional. [place] [address] any warranty or liability for your use of this information.

## 2019-08-07 NOTE — PROGRESS NOTES
SUBJECTIVE  Chief Complaint   Patient presents with    Palpitations     intermittent palpitations; denies dizziness, chest pressure, chest pain, or SOB     Thyroid Problem     concerned that abnormal thyroid may be cause of palpitations      Here for for an acute issue but also due for chronic care follow-up. He has a history of diabetes based on his last set of labs. He is controlling this through his diet and we are monitoring his A1c. He is seeing an endocrinologist for care of his hypothyroidism. He has had a recent visit with normal labs reported in July. We also reviewed their cardiac risk factors. Denies any polyuria, polydipsia, or polyphagia. He is on a ketogenic diet and reports some weight loss. He is not currently checking blood sugars. He had a few hours yesterday where he had onset of a twisting / palpitation type feeling for less than 1 minute episodes. He had no related symptoms like chest pain, chest pressure, lightheadedness, or presyncope. He had not tried anything for them. He was simply standing at work when these were happening. He has had similar a long time ago (about 2010,2011) where he wore a heart monitor which was showing no treatable abnormalities. He says that is when they discovered his thyroid disease and attributed it to that. Since treatment, they resolved. He has had improvement of his right testicular pain, although not fully resolved. He is not too interested in a urology referral.  He does mention his right thigh appears swollen and is sometimes painful and warm to touch. It is in his medial thigh. He has no pain in his calf or overall leg swelling. No personal or family history of DVT / PE that he knows. No recent long trips other than a few months ago on his motorcycle. OBJECTIVE  Blood pressure 122/84, pulse 67, temperature 98 °F (36.7 °C), temperature source Oral, resp.  rate 16, height 5' 11\" (1.803 m), weight 241 lb (109.3 kg), SpO2 98 %.  General:  alert, cooperative, well appearing, in no apparent distress. CV:  The heart sounds are regular in rate and rhythm. There is a normal S1 and S2. There or no murmurs. Lungs: Inspiratory and expiratory efforts are full and unlabored. Lung sounds are clear and equal to auscultation throughout all lung fields without wheezing, rales, or rhonchi. Extremities: There is no clubbing, cyanosis, or edema. Psych: normal affect. Mood good. Oriented x 3. Judgement and insight intact. Results for orders placed or performed in visit on 08/06/19   AMB POC HEMOGLOBIN A1C   Result Value Ref Range    Hemoglobin A1c (POC) 6.0 %     EKG: normal EKG, normal sinus rhythm, unchanged from previous tracings. ASSESSMENT / PLAN    ICD-10-CM ICD-9-CM    1. Palpitations R00.2 785.1 AMB POC EKG ROUTINE W/ 12 LEADS, INTER & REP   2. Hypothyroidism due to acquired atrophy of thyroid E03.4 244.8      246.8    3. Controlled type 2 diabetes mellitus with complication, without long-term current use of insulin ([facility]) E11.8 250.90 AMB POC HEMOGLOBIN A1C   4. Pain in right thigh M79.651 729.5 DUPLEX LOWER EXT VENOUS RIGHT   5. Swelling of right lower extremity M79.89 729.81 DUPLEX LOWER EXT VENOUS RIGHT   6. Obesity, Class I, BMI 30-34.9 E66.9 278.00      Palpitations- reviewed prior monitor results. EKG without changes and this has resolved. We will follow for now. If reoccurs, may need to repeat monitoring. Hypothyroidism -  continue Synthroid per endocrinology. Diabetes  - A1c showing he is in the prediabetic range. Cont diet / exercise. He is followed by endocrinology as well. Check eyes annually for diabetic retinopathy. Swelling of right thigh - since this is the same side as his testicular pain, I am curious if there is a blood flow issue feeding his thigh and testicle causing this. We will start with a complete doppler of his RLE. I may need to reach out to vascular for advice.     Obesity - pt ed handout on healthy living. 25 minutes of face to face time spent with the patient with at least 50% on counseling on above medical issues. Patient understands our medical plan. Patient has provided input and agrees with goals. Alternatives have been explained and offered. Risks/benefits and significant side effects of medications have been reviewed. All questions answered. The patient is to call if condition worsens or fails to improve. Follow-up and Dispositions    · Return in about 6 months (around 2/6/2020) for routine care.

## 2019-08-15 ENCOUNTER — HOSPITAL ENCOUNTER (OUTPATIENT)
Dept: VASCULAR SURGERY | Age: 59
Discharge: HOME | End: 2019-08-15
Attending: FAMILY MEDICINE
Payer: COMMERCIAL

## 2019-08-15 DIAGNOSIS — M79.89 SWELLING OF RIGHT LOWER EXTREMITY: ICD-10-CM

## 2019-08-15 DIAGNOSIS — M79.651 PAIN IN RIGHT THIGH: ICD-10-CM

## 2019-08-15 PROCEDURE — 93971 EXTREMITY STUDY: CPT

## 2019-08-29 ENCOUNTER — OFFICE VISIT (OUTPATIENT)
Dept: FAMILY MEDICINE CLINIC | Age: 59
End: 2019-08-29

## 2019-08-29 VITALS
TEMPERATURE: 98 F | SYSTOLIC BLOOD PRESSURE: 122 MMHG | BODY MASS INDEX: 33.6 KG/M2 | HEIGHT: 71 IN | WEIGHT: 240 LBS | RESPIRATION RATE: 16 BRPM | HEART RATE: 70 BPM | OXYGEN SATURATION: 96 % | DIASTOLIC BLOOD PRESSURE: 82 MMHG

## 2019-08-29 DIAGNOSIS — R00.2 PALPITATIONS: Primary | ICD-10-CM

## 2019-08-29 DIAGNOSIS — D23.4 DERMOID CYST OF SCALP: ICD-10-CM

## 2019-08-29 DIAGNOSIS — E03.4 HYPOTHYROIDISM DUE TO ACQUIRED ATROPHY OF THYROID: ICD-10-CM

## 2019-08-29 NOTE — PATIENT INSTRUCTIONS
A Healthy Lifestyle: Care Instructions  Your Care Instructions    A healthy lifestyle can help you feel good, stay at a healthy weight, and have plenty of energy for both work and play. A healthy lifestyle is something you can share with your whole family. A healthy lifestyle also can lower your risk for serious health problems, such as high blood pressure, heart disease, and diabetes. You can follow a few steps listed below to improve your health and the health of your family. Follow-up care is a key part of your treatment and safety. Be sure to make and go to all appointments, and call your doctor if you are having problems. It's also a good idea to know your test results and keep a list of the medicines you take. How can you care for yourself at home? · Do not eat too much sugar, fat, or fast foods. You can still have dessert and treats now and then. The goal is moderation. · Start small to improve your eating habits. Pay attention to portion sizes, drink less juice and soda pop, and eat more fruits and vegetables. ? Eat a healthy amount of food. A 3-ounce serving of meat, for example, is about the size of a deck of cards. Fill the rest of your plate with vegetables and whole grains. ? Limit the amount of soda and sports drinks you have every day. Drink more water when you are thirsty. ? Eat at least 5 servings of fruits and vegetables every day. It may seem like a lot, but it is not hard to reach this goal. A serving or helping is 1 piece of fruit, 1 cup of vegetables, or 2 cups of leafy, raw vegetables. Have an apple or some carrot sticks as an afternoon snack instead of a candy bar. Try to have fruits and/or vegetables at every meal.  · Make exercise part of your daily routine. You may want to start with simple activities, such as walking, bicycling, or slow swimming. Try to be active 30 to 60 minutes every day. You do not need to do all 30 to 60 minutes all at once.  For example, you can exercise 3 times a day for 10 or 20 minutes. Moderate exercise is safe for most people, but it is always a good idea to talk to your doctor before starting an exercise program.  · Keep moving. [employer] the lawn, work in the garden, or [employer]. Take the stairs instead of the elevator at work. · If you smoke, quit. People who smoke have an increased risk for heart attack, stroke, cancer, and other lung illnesses. Quitting is hard, but there are ways to boost your chance of quitting tobacco for good. ? Use nicotine gum, patches, or lozenges. ? Ask your doctor about stop-smoking programs and medicines. ? Keep trying. In addition to reducing your risk of diseases in the future, you will notice some benefits soon after you stop using tobacco. If you have shortness of breath or asthma symptoms, they will likely get better within a few weeks after you quit. · Limit how much alcohol you drink. Moderate amounts of alcohol (up to 2 drinks a day for men, 1 drink a day for women) are okay. But drinking too much can lead to liver problems, high blood pressure, and other health problems. Family health  If you have a family, there are many things you can do together to improve your health. · Eat meals together as a family as often as possible. · Eat healthy foods. This includes fruits, vegetables, lean meats and dairy, and whole grains. · Include your family in your fitness plan. Most people think of activities such as jogging or tennis as the way to fitness, but there are many ways you and your family can be more active. Anything that makes you breathe hard and gets your heart pumping is exercise. Here are some tips:  ? Walk to do errands or to take your child to school or the bus.  ? Go for a family bike ride after dinner instead of watching TV. Where can you learn more? Go to http://marcela-alex.info/. Enter N370 in the search box to learn more about \"A Healthy Lifestyle: Care Instructions. \"  Current as of: September 11, 2018  Content Version: 12.1  © 2807-0865 [employer]. Care instructions adapted under license by [employer] (which disclaims liability or warranty for this information). If you have questions about a medical condition or this instruction, always ask your healthcare professional. [address] any warranty or liability for your use of this information. Epidermoid Cyst: Care Instructions  Your Care Instructions  An epidermoid (say \"xp-xiz-UUK-heather\") cyst is a lump just under the skin. These cysts can form when a hair follicle becomes blocked. They are common in acne and may occur on the face, neck, back, and genitals. However, they can form anywhere on the body. These cysts are not cancer and do not lead to cancer. They tend not to hurt, but they can sometimes become swollen and painful. They also may break open (rupture) and cause scarring. These cysts sometimes do not cause problems and may not need treatment. If you have a cyst that is swollen and hurts, your doctor may inject it with a medicine to help it heal. But it is more likely that a painful cyst will need to be removed. Your doctor will give you a shot of numbing medicine and cut into the cyst to drain it or remove it. This makes the symptoms go away. Follow-up care is a key part of your treatment and safety. Be sure to make and go to all appointments, and call your doctor if you are having problems. It's also a good idea to know your test results and keep a list of the medicines you take. How can you care for yourself at home? · Do not squeeze the cyst or poke it with a needle to open it. This can cause swelling, redness, and infection. · Always have a doctor look at any new lumps you get to make sure that they are not serious. When should you call for help?   Watch closely for changes in your health, and be sure to contact your doctor if:    · You have a fever, redness, or swelling after you get a shot of medicine in the cyst.     · You see or feel a new lump on your skin. Where can you learn more? Go to http://marcela-alex.info/. Enter B571 in the search box to learn more about \"Epidermoid Cyst: Care Instructions. \"  Current as of: April 1, 2019  Content Version: 12.1  © 5647-2184 [employer]. Care instructions adapted under license by [email or web address] (which disclaims liability or warranty for this information). If you have questions about a medical condition or this instruction, always ask your healthcare professional. [place] [address] any warranty or liability for your use of this information. Iron-Rich Diet: Care Instructions  Your Care Instructions    Your body needs iron to make hemoglobin. Hemoglobin is a substance in red blood cells that carries oxygen from the lungs to cells all through your body. If you do not get enough iron, your body makes fewer and smaller red blood cells. As a result, your body's cells may not get enough oxygen. Adult men need 8 milligrams of iron a day; adult women need 18 milligrams of iron a day. After menopause, women need 8 milligrams of iron a day. A pregnant woman needs 27 milligrams of iron a day. Infants and young children have higher iron needs relative to their size than other age groups. People who have lost blood because of ulcers or heavy menstrual periods may become very low in iron and may develop anemia. Most people can get the iron their bodies need by eating enough of certain iron-rich foods. Your doctor may recommend that you take an iron supplement along with eating an iron-rich diet. Follow-up care is a key part of your treatment and safety. Be sure to make and go to all appointments, and call your doctor if you are having problems. It's also a good idea to know your test results and keep a list of the medicines you take.   How can you care for yourself at home?  · Make iron-rich foods a part of your daily diet. Iron-rich foods include:  ? All meats, such as chicken, beef, lamb, pork, fish, and shellfish. Liver is especially high in iron. ? Leafy green vegetables. ? Raisins, peas, beans, lentils, barley, and eggs. ? Iron-fortified breakfast cereals. · Eat foods with vitamin C along with iron-rich foods. Vitamin C helps you absorb more iron from food. Drink a glass of orange juice or another citrus juice with your food. · Eat meat and vegetables or grains together. The iron in meat helps your body absorb the iron in other foods. Where can you learn more? Go to http://marcela-alex.info/. Enter 0328 [id number] in the search box to learn more about \"Iron-Rich Diet: Care Instructions. \"  Current as of: November 7, 2018  Content Version: 12.1  © 9111-6246 Healthwise, [employer]. Care instructions adapted under license by [employer] (which disclaims liability or warranty for this information). If you have questions about a medical condition or this instruction, always ask your healthcare professional. [place] [address] any warranty or liability for your use of this information.

## 2019-08-29 NOTE — PROGRESS NOTES
SUBJECTIVE  Chief Complaint   Patient presents with    Palpitations     f/u     Other     \"lump in head\" painful to touch      Here for for follow-up of palpitations. Says he still has them. He describes it as not a faster rate but an unusual feeling heart beat that happens in strings and then resolves. He had no related symptoms like chest pain, chest pressure, lightheadedness, or presyncope. He has had similar a long time ago (about 2010,2011) where he wore a heart monitor which was showing no treatable abnormalities. He says that is when they discovered his thyroid disease and attributed it to that. Since treatment, they resolved until recently. He would like to have his thyroid testing done. He had an EKG within the past month for this and was normal.     Complains of a bump on the top of his scalp. It has been painful somewhat. OBJECTIVE  Blood pressure 122/82, pulse 70, temperature 98 °F (36.7 °C), temperature source Oral, resp. rate 16, height 5' 11\" (1.803 m), weight 240 lb (108.9 kg), SpO2 96 %. General:  alert, cooperative, well appearing, in no apparent distress. CV:  The heart sounds are regular in rate and rhythm. There is a normal S1 and S2. There or no murmurs. Lungs: Inspiratory and expiratory efforts are full and unlabored. Skin:  There is a subcutaneous minimally tender 1.5cm lump on the top of his scalp. It is without overlying skin changes. Psych: normal affect. Mood good. Oriented x 3. Judgement and insight intact. ASSESSMENT / PLAN    ICD-10-CM ICD-9-CM    1. Palpitations R00.2 785.1    2. Hypothyroidism due to acquired atrophy of thyroid E03.4 244.8 TSH 3RD GENERATION     246.8 T3, FREE      T4 (THYROXINE)      T4, FREE      T3 TOTAL   3. Dermoid cyst of scalp D23.4 216.4      Palpitations- testing for thyroid ordered. Hypothyroidism -  continue Synthroid per endocrinology. Thyroid testing ordered. Dermoid cyst, scalp - monitor for now.   Pt ed handout. 10 minutes of face to face time spent with the patient with at least 50% on counseling on above medical issues. Patient understands our medical plan. Patient has provided input and agrees with goals. Alternatives have been explained and offered. Risks/benefits and significant side effects of medications have been reviewed. All questions answered. The patient is to call if condition worsens or fails to improve.      Follow-up and Dispositions    · Return in about 6 months (around 2/29/2020) for physical.

## 2019-08-30 ENCOUNTER — HOSPITAL ENCOUNTER (OUTPATIENT)
Dept: LAB | Age: 59
Discharge: HOME | End: 2019-08-30

## 2019-08-30 LAB — XX-LABCORP SPECIMEN COL,LCBCF: NORMAL

## 2019-08-30 PROCEDURE — 99001 SPECIMEN HANDLING PT-LAB: CPT

## 2019-08-31 LAB
SPECIMEN STATUS REPORT, ROLRST: NORMAL
T3 SERPL-MCNC: 108 NG/DL (ref 71–180)
T3FREE SERPL-MCNC: 3.1 PG/ML (ref 2–4.4)
T4 FREE SERPL-MCNC: 1.35 NG/DL (ref 0.82–1.77)
T4 SERPL-MCNC: 7.4 UG/DL (ref 4.5–12)
TSH SERPL DL<=0.005 MIU/L-ACNC: 0.13 UIU/ML (ref 0.45–4.5)

## 2019-10-01 DIAGNOSIS — E78.00 HYPERCHOLESTEROLEMIA: ICD-10-CM

## 2019-10-01 RX ORDER — ATORVASTATIN CALCIUM 20 MG/1
TABLET, FILM COATED ORAL
Qty: 90 TAB | Refills: 3 | Status: SHIPPED | OUTPATIENT
Start: 2019-10-01 | End: 2020-09-24

## 2019-11-12 DIAGNOSIS — R00.2 PALPITATION: Primary | ICD-10-CM

## 2019-11-13 ENCOUNTER — TELEPHONE (OUTPATIENT)
Dept: FAMILY MEDICINE CLINIC | Age: 59
End: 2019-11-13

## 2019-11-13 NOTE — TELEPHONE ENCOUNTER
Appointment made for patient to see:    Doctor: Dr. Juan Tom    Specialty: Cardiology    Address/telephone #: [place] Vidal [address], [address] [place] [address] Str. [phone]    Appointment date/time: 11/15/19 @ 9:40 am       Pat Cooepr [phone] advising patient of appointment information.

## 2020-01-15 ENCOUNTER — OFFICE VISIT (OUTPATIENT)
Dept: CARDIOLOGY CLINIC | Age: 60
End: 2020-01-15

## 2020-01-15 VITALS
WEIGHT: 246 LBS | RESPIRATION RATE: 16 BRPM | OXYGEN SATURATION: 97 % | DIASTOLIC BLOOD PRESSURE: 68 MMHG | SYSTOLIC BLOOD PRESSURE: 134 MMHG | HEIGHT: 71 IN | BODY MASS INDEX: 34.44 KG/M2 | HEART RATE: 70 BPM

## 2020-01-15 DIAGNOSIS — R00.2 PALPITATIONS: Primary | ICD-10-CM

## 2020-01-15 NOTE — PROGRESS NOTES
Sheryl Fonseca    CC: Palps    HPI    Claudia Robles is a 64 y.o. very pleasant [place] gentleman with Hashimotos hypothyroidism and dyslipidemia. He was dx with thyroid disease since ~85.' He has a history of palpitations, related to his thyroid diease but overall hasnt been a major issue for him. Once his TSH is in range the palpitations usually stop. He has had 3 bouts of uncontrolled thyroid disease. He had a workup last time it happened- but holter was unremarkable and stress testing negative. About a month ago, he was having daily palpitations- incl racing, skipping, fluttering. He finally saw his endo who adjusted his thyroid pill. He otherwise denies CP, no SOB, no dizziness or swelling. He works out at home ~3/ week. CV RFs: age, dsylipidemia    Past Medical History:   Diagnosis Date    Allergic rhinitis 3/27/2010    Anxiety     Arthritis     Diabetes ([address]     Dishydrosis     Dyslipidemia     Echocardiogram 09/17/10    EF 60-65%. RVSP 15-20 mmHg. PUJA    ED (erectile dysfunction)     Hashimoto thyroiditis     intermittently occurs    Hx of colonoscopy 07/01/2005    [facility]/Normal; grade 1-to grade 2 internal hemorrhoids    Hypothyroidism 1983    Dr. Ramiro Martínez    Hypovitaminosis D     Thallium stress test 09/17/10    Neg stress test.  Ex. time 11:12.    Unspecified sleep apnea        Past Surgical History:   Procedure Laterality Date    COLONOSCOPY  2005    early due to bloody stool; next colonoscopy due [address] HX LAP CHOLECYSTECTOMY  9/2014    HX TONSILLECTOMY      as a child       Current Outpatient Medications   Medication Sig Dispense Refill    atorvastatin (LIPITOR) 20 mg tablet TAKE 1 TABLET nightly 90 Tab 3    VITAMIN D2 50,000 unit capsule take 1 capsule by mouth every week 12 Cap 3    ascorbate calcium (VITAMIN C PO) Take 1 Tab by mouth daily.       clotrimazole-betamethasone (LOTRISONE) topical cream Apply to left medial shin - pea sized amount or less twice daily for 2 weeks 15 g 0    FOLIC ACID PO Take 1 Tab by mouth daily.  vardenafil (LEVITRA) 20 mg tablet Take 20 mg by mouth as needed. 30 Tab 5    escitalopram oxalate (LEXAPRO) 20 mg tablet Take 1 Tab by mouth daily. 90 Tab 3    ammonium lactate (LAC-HYDRIN) 12 % topical cream Apply  to affected area two (2) times a day. rub in to affected area on feet as needed. 280 g 11    levothyroxine (SYNTHROID) 200 mcg tablet Take 200 mcg by mouth Daily (before breakfast).  cyanocobalamin (VITAMIN B-12) 500 mcg tablet Take 1,500 mcg by mouth daily.  Blood-Glucose Meter monitoring kit Use as directed 1 kit 0    glucose blood VI test strips (ASCENSIA AUTODISC VI, ONE TOUCH ULTRA TEST VI) strip Check fasting and 1 hour after meals as needed. 3 Package 3    Lancets misc Use as directed. 3 Package 3    cpap machine kit by Does Not Apply route. Auto CPAP at Range of 5-15  with heated humidifier. Mask: Mask of choice, please refit. Length of need 99 months. Replace mask and accessories as needed times 12 months. Download data at 30 days and fax at [phone]. Supplies for 99 month 1 Kit 0    MV-MN/FA/LYCOPENE/LUT/HB#178 (BRYSON MULTIVITAMIN FOR MEN PO) Take  by mouth.       minocycline (MINOCIN, DYNACIN) 100 mg capsule take 1 capsule by mouth twice a day with food  0       No Known Allergies    Social History     Socioeconomic History    Marital status:      Spouse name: Not on file    Number of children: Not on file    Years of education: Not on file    Highest education level: Not on file   Occupational History    Occupation: retail   Social Needs    Financial resource strain: Not on file    Food insecurity:     Worry: Not on file     Inability: Not on file    Transportation needs:     Medical: Not on file     Non-medical: Not on file   Tobacco Use    Smoking status: Former Smoker     Packs/day: 0.30     Years: 9.00     Pack years: 2.70     Types: Cigarettes    Smokeless tobacco: Never Used   Substance and Sexual Activity    Alcohol use: No    Drug use: No    Sexual activity: Yes     Partners: Female   Lifestyle    Physical activity:     Days per week: Not on file     Minutes per session: Not on file    Stress: Not on file   Relationships    Social connections:     Talks on phone: Not on file     Gets together: Not on file     Attends [employer] service: Not on file     Active member of club or organization: Not on file     Attends meetings of clubs or organizations: Not on file     Relationship status: Not on file    Intimate partner violence:     Fear of current or ex partner: Not on file     Emotionally abused: Not on file     Physically abused: Not on file     Forced sexual activity: Not on file   Other Topics Concern    Not on file   Social History Narrative    Not on file        The patient has a family history of    Review of Systems    14 pt Review of Systems is negative unless otherwise mentioned in the HPI. Wt Readings from Last 3 Encounters:   01/15/20 111.6 kg (246 lb)   08/29/19 108.9 kg (240 lb)   08/06/19 109.3 kg (241 lb)     Temp Readings from Last 3 Encounters:   08/29/19 98 °F (36.7 °C) (Oral)   08/06/19 98 °F (36.7 °C) (Oral)   04/09/19 98.3 °F (36.8 °C) (Oral)     BP Readings from Last 3 Encounters:   01/15/20 134/68   08/29/19 122/82   08/06/19 122/84     Pulse Readings from Last 3 Encounters:   01/15/20 70   08/29/19 70   08/06/19 67            Physical Exam:    Visit Vitals  /68 (BP 1 Location: Left arm, BP Patient Position: Sitting)   Pulse 70   Resp 16   Ht 5' 11\" (1.803 m)   Wt 111.6 kg (246 lb)   SpO2 97%   BMI 34.31 kg/m²      Physical Exam  HENT:      Head: Normocephalic and atraumatic. Eyes:      General: No scleral icterus. Pupils: Pupils are equal, round, and reactive to light. Cardiovascular:      Rate and Rhythm: Normal rate and regular rhythm. Heart sounds: Normal heart sounds. No murmur.  No friction rub. No gallop. Pulmonary:      Effort: Pulmonary effort is normal. No respiratory distress. Breath sounds: Normal breath sounds. No wheezing or rales. Chest:      Chest wall: No tenderness. Abdominal:      General: Bowel sounds are normal.      Palpations: Abdomen is soft. Skin:     General: Skin is warm and dry. Findings: No rash. Neurological:      Mental Status: He is alert and oriented to person, place, and time. EKG today shows: NSR, normal axis and intervals, no ST segment abnormalities    Holter 11/2/10:  INTERPRETATION:  1. Rhythm is sinus. 2. IN and QRS are within normal limits. 3. (1) single ventricular ectopic. 4. (11) single supraventricular ectopics. 5. There are several diary recordings of palpitations- all with rate less  than 806. There were 11 event recorder tracings noted--no dysrhythmias  noted7. Maximum heart rate was 145 at peak of exercise    Treadmill Stress Test 9/17/10:  ARRHYTHMIAS:  None.   SUMMARY:  Negative maximal stress test    Lab Results   Component Value Date/Time    TSH 0.126 (L) 08/30/2019 02:25 PM     Lab Results   Component Value Date/Time    Cholesterol, total 179 01/03/2019 09:03 AM    HDL Cholesterol 40 01/03/2019 09:03 AM    LDL, calculated 104.6 (H) 01/03/2019 09:03 AM    VLDL, calculated 34.4 01/03/2019 09:03 AM    Triglyceride 172 (H) 01/03/2019 09:03 AM    CHOL/HDL Ratio 4.5 01/03/2019 09:03 AM     Lab Results   Component Value Date/Time    Sodium 141 01/03/2019 09:03 AM    Potassium 4.1 01/03/2019 09:03 AM    Chloride 107 01/03/2019 09:03 AM    CO2 27 01/03/2019 09:03 AM    Anion gap 7 01/03/2019 09:03 AM    Glucose 100 (H) 01/03/2019 09:03 AM    BUN 15 01/03/2019 09:03 AM    Creatinine 0.89 01/03/2019 09:03 AM    BUN/Creatinine ratio 17 01/03/2019 09:03 AM    GFR est AA >60 01/03/2019 09:03 AM    GFR est non-AA >60 01/03/2019 09:03 AM    Calcium 8.4 (L) 01/03/2019 09:03 AM    Bilirubin, total 0.5 01/03/2019 09:03 AM    AST (SGOT) 19 01/03/2019 09:03 AM    Alk. phosphatase 83 01/03/2019 09:03 AM    Protein, total 7.6 01/03/2019 09:03 AM    Albumin 3.6 01/03/2019 09:03 AM    Globulin 4.0 01/03/2019 09:03 AM    A-G Ratio 0.9 01/03/2019 09:03 AM    ALT (SGPT) 27 01/03/2019 09:03 AM         Impression and Plan:  Elisabeth Mayberry is a 64 y.o. with:    1.) Rare palpitations, likely related to thyroid disease  2.) Hashimotos, with recent TSH 0.126  3.) Dyslipidemia, on statin  4.) Prior negative ischemic eval and negative holter    1.) No further testing needed at this time, if continued palps despite normal TSH please call back and can order MCT  2.) Doubt clinically significant arrhthymias, but even if pAF- his ChadsVasc is low that wouldn't change plan of care  3.) RTC prn    >45 mins explaining natural h/o Hashimotos disease and how thyroid can affect the heart- particularly contributing to arrhthymias    All questions answered    Thank you for allowing me to participate in the care of your patient, please do not hesitate to call with questions or concerns.     Kindest Regards,    Roseanne Farris, DO

## 2020-01-15 NOTE — PROGRESS NOTES
Lena Yoon is a 64 y.o. male presents in office for    Chief Complaint   Patient presents with    New Patient    Palpitations    Irregular Heart Beat        Visit Vitals  /68 (BP 1 Location: Left arm, BP Patient Position: Sitting)   Pulse 70   Resp 16   Ht 5' 11\" (1.803 m)   Wt 111.6 kg (246 lb)   SpO2 97%   BMI 34.31 kg/m²         Health Maintenance Due   Topic Date Due    Hepatitis C Screening  1960    Pneumococcal 0-64 years (1 of 1 - PPSV23) 03/23/1968    FOOT EXAM Q1  03/23/1972    EYE EXAM RETINAL OR DILATED  03/23/1972    Shingrix Vaccine Age 50> (1 of 2) 03/23/2012    MICROALBUMIN Q1  01/03/2020    LIPID PANEL Q1  01/03/2020    HEMOGLOBIN A1C Q6M  02/06/2020             1. Have you been to the ER, urgent care clinic since your last visit? Hospitalized since your last visit? No    2. Have you seen or consulted any other health care providers outside of the [address] [place] [address] since your last visit? Include any pap smears or colon screening. No    3 most recent PHQ Screens 1/15/2020   Little interest or pleasure in doing things Not at all   Feeling down, depressed, irritable, or hopeless Not at all   Total Score PHQ 2 0       Abuse Screening Questionnaire 1/15/2020   Do you ever feel afraid of your partner? N   Are you in a relationship with someone who physically or mentally threatens you? N   Is it safe for you to go home? Y       Fall Risk Assessment, last 12 mths 1/15/2020   Able to walk? Yes   Fall in past 12 months?  No       Learning Assessment 12/27/2018   PRIMARY LEARNER Patient   HIGHEST LEVEL OF EDUCATION - PRIMARY LEARNER  SOME COLLEGE   BARRIERS PRIMARY LEARNER NONE   CO-LEARNER CAREGIVER No   PRIMARY LANGUAGE ENGLISH   LEARNER PREFERENCE PRIMARY VIDEOS     -     -   ANSWERED BY patient   RELATIONSHIP SELF   ASSESSMENT COMMENT -

## 2020-03-23 ENCOUNTER — OFFICE VISIT (OUTPATIENT)
Dept: FAMILY MEDICINE CLINIC | Age: 60
End: 2020-03-23

## 2020-03-23 VITALS
HEIGHT: 71 IN | TEMPERATURE: 98 F | SYSTOLIC BLOOD PRESSURE: 128 MMHG | OXYGEN SATURATION: 98 % | BODY MASS INDEX: 33.6 KG/M2 | DIASTOLIC BLOOD PRESSURE: 80 MMHG | WEIGHT: 240 LBS | HEART RATE: 75 BPM | RESPIRATION RATE: 18 BRPM

## 2020-03-23 DIAGNOSIS — L30.0 NUMMULAR ECZEMA: Primary | ICD-10-CM

## 2020-03-23 RX ORDER — TRIAMCINOLONE ACETONIDE 1 MG/G
CREAM TOPICAL 2 TIMES DAILY
Qty: 45 G | Refills: 0 | Status: SHIPPED | OUTPATIENT
Start: 2020-03-23

## 2020-03-23 NOTE — PATIENT INSTRUCTIONS

## 2020-03-23 NOTE — PROGRESS NOTES
Patient: Hakeem Carrera MRN: [id number]  SSN: [id number]    YOB: 1960  Age: 64 y.o. Sex: male      Date of Service: 3/23/2020   Provider: AKILA Smart   Office Location:   [address] [address]. [address], [address]  Office Phone: [phone]  Office Fax: [phone]        REASON FOR VISIT:   Chief Complaint   Patient presents with    Rash     c/o rash on lower legs, ongoing past 2 weeks         VITALS:   Visit Vitals  /80 (BP 1 Location: Left arm, BP Patient Position: Sitting)   Pulse 75   Temp 98 °F (36.7 °C) (Oral)   Resp 18   Ht 5' 11\" (1.803 m)   Wt 240 lb (108.9 kg)   SpO2 98%   BMI 33.47 kg/m²       MEDICATIONS:   Current Outpatient Medications   Medication Sig Dispense Refill    atorvastatin (LIPITOR) 20 mg tablet TAKE 1 TABLET nightly 90 Tab 3    VITAMIN D2 50,000 unit capsule take 1 capsule by mouth every week 12 Cap 3    ascorbate calcium (VITAMIN C PO) Take 1 Tab by mouth daily.  clotrimazole-betamethasone (LOTRISONE) topical cream Apply to left medial shin - pea sized amount or less twice daily for 2 weeks 15 g 0    FOLIC ACID PO Take 1 Tab by mouth daily.  vardenafil (LEVITRA) 20 mg tablet Take 20 mg by mouth as needed. 30 Tab 5    escitalopram oxalate (LEXAPRO) 20 mg tablet Take 1 Tab by mouth daily. 90 Tab 3    ammonium lactate (LAC-HYDRIN) 12 % topical cream Apply  to affected area two (2) times a day. rub in to affected area on feet as needed. 280 g 11    levothyroxine (SYNTHROID) 200 mcg tablet Take 200 mcg by mouth Daily (before breakfast).  cyanocobalamin (VITAMIN B-12) 500 mcg tablet Take 1,500 mcg by mouth daily.  Blood-Glucose Meter monitoring kit Use as directed 1 kit 0    glucose blood VI test strips (ASCENSIA AUTODISC VI, ONE TOUCH ULTRA TEST VI) strip Check fasting and 1 hour after meals as needed. 3 Package 3    Lancets misc Use as directed.  3 Package 3    cpap machine kit by Does Not Apply route. Auto CPAP at Range of 5-15  with heated humidifier. Mask: Mask of choice, please refit. Length of need 99 months. Replace mask and accessories as needed times 12 months. Download data at 30 days and fax at [phone]. Supplies for 99 month 1 Kit 0    MV-MN/FA/LYCOPENE/LUT/HB#178 (BRYSON MULTIVITAMIN FOR MEN PO) Take  by mouth. ALLERGIES:   No Known Allergies     ACTIVE MEDICAL PROBLEMS:  Patient Active Problem List   Diagnosis Code    Allergic rhinitis J30.9    Eczema L30.9    Vitamin D deficiency E55.9    Anxiety F41.9    Hypercholesterolemia E78.00    Hypothyroidism due to acquired atrophy of thyroid E03.4          HISTORY OF PRESENT ILLNESS:   Ignacio Garcia is a 64 y.o. male who presents to the office for acute care. PCP: Dr. Mari Maldonado     Here today with complaints of a rash on his b/l lower legs x about 2 weeks. Patient reports that the rash started as a few tiny pink dots, which have since increased in size to round, reddish lesions ranging from the size of a dime to a quarter. They are mildly itchy. He has been applying an OTC Gold Bond eczema cream, which seems to help. The rash becomes dry and flaky without the cream. He denies any new soaps, laundry detergents, or new pets in the home. No contacts with similar rash. REVIEW OF SYSTEMS:  Review of Systems   Constitutional: Negative for chills and fever. Respiratory: Negative for shortness of breath. Cardiovascular: Negative for chest pain. Skin: Positive for itching and rash. PHYSICAL EXAMINATION:  Physical Exam  Constitutional:       Appearance: Normal appearance. He is not ill-appearing. Skin:     Comments: Scattered annular lesions ranging from approx 2 mm to 6 mm in diameter over b/l lower extremities. Largest lesions are mildly raised with slight overlying scale          RESULTS:  No results found for this visit on 03/23/20. ASSESSMENT/PLAN:  Diagnoses and all orders for this visit:    1. Nummular eczema  - Suspect nummular eczema  - Trial of topical steroid as below  - Continue moisturizing with Gold Monzon   - Patient to f/u via MyChart if symptoms worsen or fail to improve within 2 weeks  Orders:    -     triamcinolone acetonide (KENALOG) 0.1 % topical cream; Apply  to affected area two (2) times a day. use thin layer        Follow-up and Dispositions    · Return in about 3 months (around 6/23/2020) for annual physical Jaynie Apley). All questions answered. Patient expresses understanding and agrees with the plan as detailed above.            PATIENT CARE TEAM:   Patient Care Team:  Ar Ortega MD as PCP - General (Family Practice)  Ar Ortega MD as PCP - [facility] Empaneled Provider  Hernando Thorne MD (Inactive) as Physician (Endocrinology)  Sabiha Hutchinson, [address] (Optometry)  Lina Moss MD (Plastic Surgery)       Cheron Cowden, PA   March 23, 2020   3:16 PM

## 2020-09-19 DIAGNOSIS — Z12.5 PROSTATE CANCER SCREENING: ICD-10-CM

## 2020-09-19 DIAGNOSIS — E55.9 VITAMIN D DEFICIENCY: ICD-10-CM

## 2020-09-19 DIAGNOSIS — E03.4 HYPOTHYROIDISM DUE TO ACQUIRED ATROPHY OF THYROID: Primary | ICD-10-CM

## 2020-09-19 DIAGNOSIS — E11.8 CONTROLLED TYPE 2 DIABETES MELLITUS WITH COMPLICATION, WITHOUT LONG-TERM CURRENT USE OF INSULIN (HCC): ICD-10-CM

## 2020-09-21 NOTE — PROGRESS NOTES
Spoke with patient. He has been scheduled for 10/26/2020 (sooner appts available but patient requests an early morning visit). Copy of lab order and appt card mailed to patient. His address was confirmed during phone conversation.

## 2020-09-24 DIAGNOSIS — E78.00 HYPERCHOLESTEROLEMIA: ICD-10-CM

## 2020-09-24 RX ORDER — ATORVASTATIN CALCIUM 20 MG/1
TABLET, FILM COATED ORAL
Qty: 90 TAB | Refills: 0 | Status: SHIPPED | OUTPATIENT
Start: 2020-09-24 | End: 2022-01-17 | Stop reason: SDUPTHER

## 2020-10-09 ENCOUNTER — HOSPITAL ENCOUNTER (OUTPATIENT)
Dept: LAB | Age: 60
Discharge: HOME | End: 2020-10-09
Payer: COMMERCIAL

## 2020-10-09 DIAGNOSIS — E55.9 VITAMIN D DEFICIENCY: ICD-10-CM

## 2020-10-09 DIAGNOSIS — Z12.5 PROSTATE CANCER SCREENING: ICD-10-CM

## 2020-10-09 DIAGNOSIS — E03.4 HYPOTHYROIDISM DUE TO ACQUIRED ATROPHY OF THYROID: ICD-10-CM

## 2020-10-09 DIAGNOSIS — E11.8 CONTROLLED TYPE 2 DIABETES MELLITUS WITH COMPLICATION, WITHOUT LONG-TERM CURRENT USE OF INSULIN (HCC): ICD-10-CM

## 2020-10-09 LAB
25(OH)D3 SERPL-MCNC: 38.2 NG/ML (ref 30–100)
ALBUMIN SERPL-MCNC: 3.6 G/DL (ref 3.4–5)
ALBUMIN/GLOB SERPL: 0.9 {RATIO} (ref 0.8–1.7)
ALP SERPL-CCNC: 85 U/L (ref 45–117)
ALT SERPL-CCNC: 27 U/L (ref 16–61)
ANION GAP SERPL CALC-SCNC: 4 MMOL/L (ref 3–18)
AST SERPL-CCNC: 20 U/L (ref 10–38)
BASOPHILS # BLD: 0 K/UL (ref 0–0.1)
BASOPHILS NFR BLD: 0 % (ref 0–2)
BILIRUB SERPL-MCNC: 0.5 MG/DL (ref 0.2–1)
BUN SERPL-MCNC: 14 MG/DL (ref 7–18)
BUN/CREAT SERPL: 14 (ref 12–20)
CALCIUM SERPL-MCNC: 8.6 MG/DL (ref 8.5–10.1)
CHLORIDE SERPL-SCNC: 109 MMOL/L (ref 100–111)
CHOLEST SERPL-MCNC: 126 MG/DL
CO2 SERPL-SCNC: 31 MMOL/L (ref 21–32)
CREAT SERPL-MCNC: 0.98 MG/DL (ref 0.6–1.3)
CREAT UR-MCNC: 282 MG/DL (ref 30–125)
DIFFERENTIAL METHOD BLD: NORMAL
EOSINOPHIL # BLD: 0.1 K/UL (ref 0–0.4)
EOSINOPHIL NFR BLD: 3 % (ref 0–5)
ERYTHROCYTE [DISTWIDTH] IN BLOOD BY AUTOMATED COUNT: 14.1 % (ref 11.6–14.5)
EST. AVERAGE GLUCOSE BLD GHB EST-MCNC: 123 MG/DL
GLOBULIN SER CALC-MCNC: 4.2 G/DL (ref 2–4)
GLUCOSE SERPL-MCNC: 94 MG/DL (ref 74–99)
HBA1C MFR BLD: 5.9 % (ref 4.2–5.6)
HCT VFR BLD AUTO: 42.6 % (ref 36–48)
HDLC SERPL-MCNC: 44 MG/DL (ref 40–60)
HDLC SERPL: 2.9 {RATIO} (ref 0–5)
HGB BLD-MCNC: 13.9 G/DL (ref 13–16)
LDLC SERPL CALC-MCNC: 64.4 MG/DL (ref 0–100)
LIPID PROFILE,FLP: NORMAL
LYMPHOCYTES # BLD: 1.7 K/UL (ref 0.9–3.6)
LYMPHOCYTES NFR BLD: 36 % (ref 21–52)
MCH RBC QN AUTO: 27.5 PG (ref 24–34)
MCHC RBC AUTO-ENTMCNC: 32.6 G/DL (ref 31–37)
MCV RBC AUTO: 84.4 FL (ref 74–97)
MICROALBUMIN UR-MCNC: 1.36 MG/DL (ref 0–3)
MICROALBUMIN/CREAT UR-RTO: 5 MG/G (ref 0–30)
MONOCYTES # BLD: 0.4 K/UL (ref 0.05–1.2)
MONOCYTES NFR BLD: 9 % (ref 3–10)
NEUTS SEG # BLD: 2.5 K/UL (ref 1.8–8)
NEUTS SEG NFR BLD: 52 % (ref 40–73)
PLATELET # BLD AUTO: 225 K/UL (ref 135–420)
PMV BLD AUTO: 10.3 FL (ref 9.2–11.8)
POTASSIUM SERPL-SCNC: 4.3 MMOL/L (ref 3.5–5.5)
PROT SERPL-MCNC: 7.8 G/DL (ref 6.4–8.2)
PSA SERPL-MCNC: 1.2 NG/ML (ref 0–4)
RBC # BLD AUTO: 5.05 M/UL (ref 4.7–5.5)
SODIUM SERPL-SCNC: 144 MMOL/L (ref 136–145)
TRIGL SERPL-MCNC: 88 MG/DL (ref ?–150)
TSH SERPL DL<=0.05 MIU/L-ACNC: 0.28 UIU/ML (ref 0.36–3.74)
VLDLC SERPL CALC-MCNC: 17.6 MG/DL
WBC # BLD AUTO: 4.8 K/UL (ref 4.6–13.2)

## 2020-10-09 PROCEDURE — 84153 ASSAY OF PSA TOTAL: CPT

## 2020-10-09 PROCEDURE — 36415 COLL VENOUS BLD VENIPUNCTURE: CPT

## 2020-10-09 PROCEDURE — 80053 COMPREHEN METABOLIC PANEL: CPT

## 2020-10-09 PROCEDURE — 82306 VITAMIN D 25 HYDROXY: CPT

## 2020-10-09 PROCEDURE — 83036 HEMOGLOBIN GLYCOSYLATED A1C: CPT

## 2020-10-09 PROCEDURE — 82043 UR ALBUMIN QUANTITATIVE: CPT

## 2020-10-09 PROCEDURE — 84443 ASSAY THYROID STIM HORMONE: CPT

## 2020-10-09 PROCEDURE — 85025 COMPLETE CBC W/AUTO DIFF WBC: CPT

## 2020-10-09 PROCEDURE — 80061 LIPID PANEL: CPT

## 2020-10-23 NOTE — TELEPHONE ENCOUNTER
Spoke with patient. Covid 19 questionnaire completed with patient over the phone in preparation for in person visit on 10/26/2020 CPE with Dr. Loki Pisano. Patient's answers to questions are in BOLD print. Have you been diagnosed with, tested for, or told that you are suspected of having COVID-19 (coronovirus)? No  Have you had a fever or taken medication to treat a fever in the past 72 hours? No  Have you had a cough, SOB, or flu-like symptoms within the past 3 days? No  Have you had direct contact with someone who tested positive for COVID-19 within the past 14 days? No  Do you have a household member with flu-like symptoms, including fever, cough, or SOB? No  Do you currently have flu-like symptoms including fever, cough, or SOB? No  Are you experiencing new loss of taste or smell?  No

## 2020-10-26 ENCOUNTER — OFFICE VISIT (OUTPATIENT)
Dept: FAMILY MEDICINE CLINIC | Age: 60
End: 2020-10-26
Payer: COMMERCIAL

## 2020-10-26 VITALS
RESPIRATION RATE: 16 BRPM | TEMPERATURE: 98.3 F | SYSTOLIC BLOOD PRESSURE: 116 MMHG | HEART RATE: 57 BPM | OXYGEN SATURATION: 97 % | BODY MASS INDEX: 31.5 KG/M2 | DIASTOLIC BLOOD PRESSURE: 78 MMHG | HEIGHT: 71 IN | WEIGHT: 225 LBS

## 2020-10-26 DIAGNOSIS — E78.00 HYPERCHOLESTEROLEMIA: ICD-10-CM

## 2020-10-26 DIAGNOSIS — E55.9 VITAMIN D DEFICIENCY: ICD-10-CM

## 2020-10-26 DIAGNOSIS — E03.4 HYPOTHYROIDISM DUE TO ACQUIRED ATROPHY OF THYROID: ICD-10-CM

## 2020-10-26 DIAGNOSIS — E11.8 CONTROLLED TYPE 2 DIABETES MELLITUS WITH COMPLICATION, WITHOUT LONG-TERM CURRENT USE OF INSULIN (HCC): ICD-10-CM

## 2020-10-26 DIAGNOSIS — Z00.00 WELL ADULT EXAM: Primary | ICD-10-CM

## 2020-10-26 DIAGNOSIS — E66.9 OBESITY, CLASS I, BMI 30-34.9: ICD-10-CM

## 2020-10-26 DIAGNOSIS — L85.3 DRY SKIN: ICD-10-CM

## 2020-10-26 PROCEDURE — 99396 PREV VISIT EST AGE 40-64: CPT | Performed by: FAMILY MEDICINE

## 2020-10-26 RX ORDER — FLUOCINOLONE ACETONIDE 0.11 MG/ML
OIL AURICULAR (OTIC)
Qty: 20 ML | Refills: 0 | Status: SHIPPED | OUTPATIENT
Start: 2020-10-26 | End: 2021-07-23 | Stop reason: SDUPTHER

## 2020-10-26 RX ORDER — AMMONIUM LACTATE 12 G/100G
CREAM TOPICAL 2 TIMES DAILY
Qty: 280 G | Refills: 11 | Status: SHIPPED | OUTPATIENT
Start: 2020-10-26

## 2020-10-26 RX ORDER — ZOLPIDEM TARTRATE 5 MG/1
1 TABLET ORAL
COMMUNITY
Start: 2020-08-17

## 2020-10-26 RX ORDER — LANCETS
EACH MISCELLANEOUS
Qty: 3 PACKAGE | Refills: 3 | Status: SHIPPED | OUTPATIENT
Start: 2020-10-26

## 2020-10-26 RX ORDER — INSULIN PUMP SYRINGE, 3 ML
EACH MISCELLANEOUS
Qty: 1 KIT | Refills: 0 | Status: SHIPPED | OUTPATIENT
Start: 2020-10-26

## 2020-10-26 RX ORDER — LEVOTHYROXINE SODIUM 150 MCG
1 TABLET ORAL DAILY
COMMUNITY
Start: 2020-07-20

## 2020-10-26 NOTE — PATIENT INSTRUCTIONS
Well Visit, Men 48 to 72: Care Instructions Your Care Instructions Physical exams can help you stay healthy. Your doctor has checked your overall health and may have suggested ways to take good care of yourself. He or she also may have recommended tests. At home, you can help prevent illness with healthy eating, regular exercise, and other steps. Follow-up care is a key part of your treatment and safety. Be sure to make and go to all appointments, and call your doctor if you are having problems. It's also a good idea to know your test results and keep a list of the medicines you take. How can you care for yourself at home? · Reach and stay at a healthy weight. This will lower your risk for many problems, such as obesity, diabetes, heart disease, and high blood pressure. · Get at least 30 minutes of exercise on most days of the week. Walking is a good choice. You also may want to do other activities, such as running, swimming, cycling, or playing tennis or team sports. · Do not smoke. Smoking can make health problems worse. If you need help quitting, talk to your doctor about stop-smoking programs and medicines. These can increase your chances of quitting for good. · Protect your skin from too much sun. When you're outdoors from 10 a.m. to 4 p.m., stay in the shade or cover up with clothing and a hat with a wide brim. Wear sunglasses that block UV rays. Even when it's cloudy, put broad-spectrum sunscreen (SPF 30 or higher) on any exposed skin. · See a dentist one or two times a year for checkups and to have your teeth cleaned. · Wear a seat belt in the car. Follow your doctor's advice about when to have certain tests. These tests can spot problems early. · Cholesterol. Your doctor will tell you how often to have this done based on your overall health and other things that can increase your risk for heart attack and stroke. · Blood pressure.  Have your blood pressure checked during a routine doctor visit. Your doctor will tell you how often to check your blood pressure based on your age, your blood pressure results, and other factors. · Prostate exam. Talk to your doctor about whether you should have a blood test (called a PSA test) for prostate cancer. Experts recommend that you discuss the benefits and risks of the test with your doctor before you decide whether to have this test. 
· Diabetes. Ask your doctor whether you should have tests for diabetes. · Vision. Some experts recommend that you have yearly exams for glaucoma and other age-related eye problems starting at age 48. · Hearing. Tell your doctor if you notice any change in your hearing. You can have tests to find out how well you hear. · Colorectal cancer. Your risk for colorectal cancer gets higher as you get older. Some experts say that adults should start regular screening at age 48 and stop at age 76. Others say to start before age 48 or continue after age 76. Talk with your doctor about your risk and when to start and stop screening. · Heart attack and stroke risk. At least every 4 to 6 years, you should have your risk for heart attack and stroke assessed. Your doctor uses factors such as your age, blood pressure, cholesterol, and whether you smoke or have diabetes to show what your risk for a heart attack or stroke is over the next 10 years. · Abdominal aortic aneurysm. Ask your doctor whether you should have a test to check for an aneurysm. You may need a test if you ever smoked or if your parent, brother, sister, or child has had an aneurysm. When should you call for help? Watch closely for changes in your health, and be sure to contact your doctor if you have any problems or symptoms that concern you. Where can you learn more? Go to http://www.gray.com/ Enter V490 in the search box to learn more about \"Well Visit, Men 48 to 72: Care Instructions. \" 
 Current as of: May 27, 2020               Content Version: 12.6 © 0966-1149 [employer]. Care instructions adapted under license by [employer] (which disclaims liability or warranty for this information). If you have questions about a medical condition or this instruction, always ask your healthcare professional. [address] any warranty or liability for your use of this information.

## 2020-10-26 NOTE — PROGRESS NOTES
Chief Complaint   Patient presents with    Physical    Results    Thyroid Problem    Diabetes     Subjective:   Andria Fraire is a 64 y.o. male presenting for his annual checkup. ROS:  Feeling well. No dyspnea or chest pain on exertion. No abdominal pain, change in bowel habits, black or bloody stools. No urinary tract or prostatic symptoms. No neurological complaints. Specific concerns today:    He has regular follow-up with his endocrinologist for hypothyroidism. Palpitations resolved. Rash under arms have resolved. Dry skin on bottom of feet. Needs Lac Hydrin. Doing well on lexapro as things are going well. He has no complaints of depression or anxiety at this time. Current Outpatient Medications   Medication Sig Dispense Refill    Synthroid 150 mcg tablet Take 1 Tab by mouth daily.  zolpidem (AMBIEN) 5 mg tablet Take 1 Tab by mouth nightly as needed.  atorvastatin (LIPITOR) 20 mg tablet take 1 tablet by mouth at bedtime 90 Tab 0    triamcinolone acetonide (KENALOG) 0.1 % topical cream Apply  to affected area two (2) times a day. use thin layer 45 g 0    VITAMIN D2 50,000 unit capsule take 1 capsule by mouth every week 12 Cap 3    ascorbate calcium (VITAMIN C PO) Take 1 Tab by mouth daily.  clotrimazole-betamethasone (LOTRISONE) topical cream Apply to left medial shin - pea sized amount or less twice daily for 2 weeks 15 g 0    FOLIC ACID PO Take 1 Tab by mouth daily.  vardenafil (LEVITRA) 20 mg tablet Take 20 mg by mouth as needed. 30 Tab 5    escitalopram oxalate (LEXAPRO) 20 mg tablet Take 1 Tab by mouth daily. 90 Tab 3    ammonium lactate (LAC-HYDRIN) 12 % topical cream Apply  to affected area two (2) times a day. rub in to affected area on feet as needed. 280 g 11    cyanocobalamin (VITAMIN B-12) 500 mcg tablet Take 1,500 mcg by mouth daily.  cpap machine kit by Does Not Apply route. Auto CPAP at Range of 5-15  with heated humidifier.     Mask: Mask of choice, please refit. Length of need 99 months. Replace mask and accessories as needed times 12 months. Download data at 30 days and fax at [phone]. Supplies for 99 month 1 Kit 0    MV-MN/FA/LYCOPENE/LUT/HB#178 (BRYSON MULTIVITAMIN FOR MEN PO) Take  by mouth.  Blood-Glucose Meter monitoring kit Use as directed 1 kit 0    glucose blood VI test strips (ASCENSIA AUTODISC VI, ONE TOUCH ULTRA TEST VI) strip Check fasting and 1 hour after meals as needed. 3 Package 3    Lancets misc Use as directed. 3 Package 3     No Known Allergies  Past Medical History:   Diagnosis Date    Allergic rhinitis 3/27/2010    Anxiety     Arthritis     Diabetes ([address]     Dishydrosis     Dyslipidemia     Echocardiogram 09/17/10    EF 60-65%. RVSP 15-20 mmHg.  PUJA    ED (erectile dysfunction)     Hashimoto thyroiditis     intermittently occurs    Hx of colonoscopy 07/01/2005    [facility]/Normal; grade 1-to grade 2 internal hemorrhoids    Hypothyroidism 1983    Dr. Alma Rosa Tejeda Hypovitaminosis D     Thallium stress test 09/17/10    Neg stress test.  Ex. time 11:12.    Unspecified sleep apnea      Past Surgical History:   Procedure Laterality Date    COLONOSCOPY  2005    early due to bloody stool; [address] [facility] [address] [facility] HX LAP CHOLECYSTECTOMY  9/2014    HX TONSILLECTOMY      as a child     Family History   Problem Relation Age of Onset    Diabetes Mother     Diabetes Father     Heart Disease Father     Diabetes Paternal Aunt     Diabetes Paternal Uncle     Diabetes Paternal Grandmother     Diabetes Paternal Grandfather     Thyroid Disease Brother     Arthritis-osteo Brother      Social History     Tobacco Use    Smoking status: Former Smoker     Packs/day: 0.30     Years: 9.00     Pack years: 2.70     Types: Cigarettes    Smokeless tobacco: Never Used   Substance Use Topics    Alcohol use: No       Objective:     Visit Vitals  /78 (BP 1 Location: Left arm, BP Patient Position: Sitting)   Pulse (!) 57   Temp 98.3 °F (36.8 °C) (Oral)   Resp 16   Ht 5' 11\" (1.803 m)   Wt 225 lb (102.1 kg)   SpO2 97%   BMI 31.38 kg/m²     The patient appears well, alert, oriented x 3, in no distress. ENT normal.  Neck supple. No thyromegaly. SARITA. Lungs are clear, good air entry, no wheezes, rhonchi or rales. S1 and S2 normal, no murmurs, regular rate and rhythm. Abdomen is soft without tenderness, guarding, mass or organomegaly. Extremities show no edema. Neurological is normal without focal findings. Psych: normal affect. Mood good. Oriented x 3. Judgement and insight intact. No adenopathy. Results for orders placed or performed during the hospital encounter of 10/09/20   CBC WITH AUTOMATED DIFF   Result Value Ref Range    WBC 4.8 4.6 - 13.2 K/uL    RBC 5.05 4.70 - 5.50 M/uL    HGB 13.9 13.0 - 16.0 g/dL    HCT 42.6 36.0 - 48.0 %    MCV 84.4 74.0 - 97.0 FL    MCH 27.5 24.0 - 34.0 PG    MCHC 32.6 31.0 - 37.0 g/dL    RDW 14.1 11.6 - 14.5 %    PLATELET 408 730 - 603 K/uL    MPV 10.3 9.2 - 11.8 FL    NEUTROPHILS 52 40 - 73 %    LYMPHOCYTES 36 21 - 52 %    MONOCYTES 9 3 - 10 %    EOSINOPHILS 3 0 - 5 %    BASOPHILS 0 0 - 2 %    ABS. NEUTROPHILS 2.5 1.8 - 8.0 K/UL    ABS. LYMPHOCYTES 1.7 0.9 - 3.6 K/UL    ABS. MONOCYTES 0.4 0.05 - 1.2 K/UL    ABS. EOSINOPHILS 0.1 0.0 - 0.4 K/UL    ABS. BASOPHILS 0.0 0.0 - 0.1 K/UL    DF AUTOMATED     METABOLIC PANEL, COMPREHENSIVE   Result Value Ref Range    Sodium 144 136 - 145 mmol/L    Potassium 4.3 3.5 - 5.5 mmol/L    Chloride 109 100 - 111 mmol/L    CO2 31 21 - 32 mmol/L    Anion gap 4 3.0 - 18 mmol/L    Glucose 94 74 - 99 mg/dL    BUN 14 7.0 - 18 MG/DL    Creatinine 0.98 0.6 - 1.3 MG/DL    BUN/Creatinine ratio 14 12 - 20      GFR est AA >60 >60 ml/min/1.73m2    GFR est non-AA >60 >60 ml/min/1.73m2    Calcium 8.6 8.5 - 10.1 MG/DL    Bilirubin, total 0.5 0.2 - 1.0 MG/DL    ALT (SGPT) 27 16 - 61 U/L    AST (SGOT) 20 10 - 38 U/L    Alk. phosphatase 85 45 - 117 U/L    Protein, total 7.8 6.4 - 8.2 g/dL    Albumin 3.6 3.4 - 5.0 g/dL    Globulin 4.2 (H) 2.0 - 4.0 g/dL    A-G Ratio 0.9 0.8 - 1.7     VITAMIN D, 25 HYDROXY   Result Value Ref Range    Vitamin D 25-Hydroxy 38.2 30 - 100 ng/mL   LIPID PANEL   Result Value Ref Range    LIPID PROFILE          Cholesterol, total 126 <200 MG/DL    Triglyceride 88 <150 MG/DL    HDL Cholesterol 44 40 - 60 MG/DL    LDL, calculated 64.4 0 - 100 MG/DL    VLDL, calculated 17.6 MG/DL    CHOL/HDL Ratio 2.9 0 - 5.0     PSA SCREENING (SCREENING)   Result Value Ref Range    Prostate Specific Ag 1.2 0.0 - 4.0 ng/mL   HEMOGLOBIN A1C WITH EAG   Result Value Ref Range    Hemoglobin A1c 5.9 (H) 4.2 - 5.6 %    Est. average glucose 123 mg/dL   MICROALBUMIN, UR, RAND W/ MICROALB/CREAT RATIO   Result Value Ref Range    Microalbumin,urine random 1.36 0 - 3.0 MG/DL    Creatinine, urine 282.00 (H) 30 - 125 mg/dL    Microalbumin/Creat ratio (mg/g creat) 5 0 - 30 mg/g   TSH 3RD GENERATION   Result Value Ref Range    TSH 0.28 (L) 0.36 - 3.74 uIU/mL     Assessment/Plan:       ICD-10-CM ICD-9-CM    1. Well adult exam  Z00.00 V70.0    2. Controlled type 2 diabetes mellitus with complication, without long-term current use of insulin ([facility])  E11.8 250.90  DIABETES FOOT EXAM      glucose blood VI test strips (ASCENSIA AUTODISC VI, ONE TOUCH ULTRA TEST VI) strip      lancets misc      Blood-Glucose Meter monitoring kit   3. Hypercholesterolemia  E78.00 272.0    4. Hypothyroidism due to acquired atrophy of thyroid  E03.4 244.8      246.8    5. Obesity, Class I, BMI 30-34.9  E66.9 278.00    6. Vitamin D deficiency  E55.9 268.9    7. Dry skin  L85.3 701.1 ammonium lactate (Lac-Hydrin) 12 % topical cream     Age and sex specific counseling. Labs reviewed. A1c showing good control. Work on diet and exercise. Cont vit D. Lac Hydrin for feet. Continue current care. Cont per endocrinology. Flu vaccine up to date.     All chart history elements were reviewed by me at the time of the visit even though marked at time of note closure. Patient understands our medical plan. Patient has provided input and agrees with goals. Alternatives have been explained and offered. All questions answered. The patient is to call if condition worsens or fails to improve. Follow-up and Dispositions    · Return in about 6 months (around 4/26/2021) for routine care. A1c to be done in office.

## 2021-07-23 RX ORDER — FLUOCINOLONE ACETONIDE 0.11 MG/ML
OIL AURICULAR (OTIC)
Qty: 20 ML | Refills: 0 | Status: SHIPPED | OUTPATIENT
Start: 2021-07-23

## 2021-09-27 LAB — HBA1C MFR BLD HPLC: 6.1 %

## 2021-12-09 ENCOUNTER — VIRTUAL VISIT (OUTPATIENT)
Dept: FAMILY MEDICINE CLINIC | Age: 61
End: 2021-12-09
Payer: OTHER GOVERNMENT

## 2021-12-09 DIAGNOSIS — M62.830 LUMBAR PARASPINAL MUSCLE SPASM: ICD-10-CM

## 2021-12-09 DIAGNOSIS — E11.8 CONTROLLED TYPE 2 DIABETES MELLITUS WITH COMPLICATION, WITHOUT LONG-TERM CURRENT USE OF INSULIN (HCC): ICD-10-CM

## 2021-12-09 DIAGNOSIS — M54.16 LUMBAR RADICULAR PAIN: Primary | ICD-10-CM

## 2021-12-09 PROCEDURE — 99214 OFFICE O/P EST MOD 30 MIN: CPT | Performed by: FAMILY MEDICINE

## 2021-12-09 RX ORDER — DIAZEPAM 5 MG/1
5 TABLET ORAL
Qty: 9 TABLET | Refills: 0 | Status: SHIPPED | OUTPATIENT
Start: 2021-12-09 | End: 2021-12-12

## 2021-12-09 RX ORDER — GABAPENTIN 300 MG/1
300 CAPSULE ORAL 2 TIMES DAILY
Qty: 60 CAPSULE | Refills: 0 | Status: SHIPPED | OUTPATIENT
Start: 2021-12-09 | End: 2022-01-20

## 2021-12-09 RX ORDER — PREDNISONE 10 MG/1
TABLET ORAL
Qty: 30 TABLET | Refills: 0 | Status: SHIPPED | OUTPATIENT
Start: 2021-12-09 | End: 2022-01-20

## 2021-12-09 NOTE — PROGRESS NOTES
Erica Rey, was evaluated through a synchronous (real-time) audio-video encounter. The patient (or guardian if applicable) is aware that this is a billable service. Verbal consent to proceed has been obtained within the past 12 months. The visit was conducted pursuant to the emergency declaration under the [facility], [address] [place] [address] authority and the [employer] and [employer] General Act. Patient identification was verified, and a caregiver was present when appropriate. The patient was located in a state where the provider was credentialed to provide care. SUBJECTIVE  Chief Complaint   Patient presents with    Back Pain      Patient presents complaining of a 2 day history of low back pain. Location: right side  Quality: sore, tight, constant, severe  Injury: bent forward and he twisted a bit and then had sudden onset of pain, 10/10. Radiation: right side down leg to toes started right away  Has tried: Went to the ER shortly after - [facility], flexeril and percocet  Aggravating factors:  Standing up hurts  CT abd/ pelvis - normal besides   History of similar: has had back pain in the past  The patient denies any weakness radiating into the lower extremity. Has tingling and numbness in his toes (1st,2nd). The patient denies any bowel or bladder dysfunction. The patient has not been evaluated previously for their back pain. He had a fever and was tested for covid and was positive. ROS:   Constitutional: no weight loss, night sweats or fevers. GI:  No abdominal pain, nausea, or vomiting. Cardiac:  The patient denies any chest pain or chest tightness. Resp: no dyspnea. No cough. OBJECTIVE    General:  alert, cooperative, uncomfortably seated. ASSESSMENT / PLAN    ICD-10-CM ICD-9-CM    1. Lumbar radicular pain  M54.16 724.4 MRI LUMB SPINE WO CONT      gabapentin (NEURONTIN) 300 mg capsule   2.  Lumbar paraspinal muscle spasm  M62.830 724.8 diazePAM (Valium) 5 mg tablet   3. Controlled type 2 diabetes mellitus with complication, without long-term current use of insulin (HCC)  E11.8 250.90       Lumbar radicular pain with spams - New diagnosis with uncertain prognosis and rx management. The patient was advised on discontinuing his percocet and flexeril. Start a prednisone taper per the orders along with valium for 3 days and neurontin 300mg po bid. Ordered MRI due to high concern for disc. DM2 - beware of glucose elevation on prednisone taper. Last A1c in Sept was 6.1%. All chart history elements were reviewed by me at the time of the visit even though marked at time of note closure. Patient understands our medical plan. Patient has provided input and agrees with goals. Alternatives have been explained and offered. All questions answered. The patient is to call if condition worsens or fails to improve. RTC for CPE 4-6 weeks.

## 2022-01-17 DIAGNOSIS — E78.00 HYPERCHOLESTEROLEMIA: ICD-10-CM

## 2022-01-20 ENCOUNTER — OFFICE VISIT (OUTPATIENT)
Dept: FAMILY MEDICINE CLINIC | Age: 62
End: 2022-01-20
Payer: COMMERCIAL

## 2022-01-20 VITALS
OXYGEN SATURATION: 97 % | DIASTOLIC BLOOD PRESSURE: 76 MMHG | WEIGHT: 230 LBS | TEMPERATURE: 97.7 F | SYSTOLIC BLOOD PRESSURE: 118 MMHG | RESPIRATION RATE: 16 BRPM | HEART RATE: 77 BPM | BODY MASS INDEX: 32.2 KG/M2 | HEIGHT: 71 IN

## 2022-01-20 DIAGNOSIS — E55.9 VITAMIN D DEFICIENCY: ICD-10-CM

## 2022-01-20 DIAGNOSIS — M54.16 LUMBAR RADICULAR PAIN: ICD-10-CM

## 2022-01-20 DIAGNOSIS — E78.00 HYPERCHOLESTEROLEMIA: ICD-10-CM

## 2022-01-20 DIAGNOSIS — Z00.00 WELL ADULT EXAM: Primary | ICD-10-CM

## 2022-01-20 DIAGNOSIS — E66.9 OBESITY, CLASS I, BMI 30-34.9: ICD-10-CM

## 2022-01-20 DIAGNOSIS — Z12.5 SCREENING FOR PROSTATE CANCER: ICD-10-CM

## 2022-01-20 DIAGNOSIS — R29.898 WEAKNESS OF FOOT, RIGHT: ICD-10-CM

## 2022-01-20 DIAGNOSIS — E03.4 HYPOTHYROIDISM DUE TO ACQUIRED ATROPHY OF THYROID: ICD-10-CM

## 2022-01-20 DIAGNOSIS — R20.0 NUMBNESS OF RIGHT FOOT: ICD-10-CM

## 2022-01-20 DIAGNOSIS — E11.8 CONTROLLED TYPE 2 DIABETES MELLITUS WITH COMPLICATION, WITHOUT LONG-TERM CURRENT USE OF INSULIN (HCC): ICD-10-CM

## 2022-01-20 PROCEDURE — 99396 PREV VISIT EST AGE 40-64: CPT | Performed by: FAMILY MEDICINE

## 2022-01-20 RX ORDER — ATORVASTATIN CALCIUM 20 MG/1
TABLET, FILM COATED ORAL
Qty: 90 TABLET | Refills: 3 | Status: SHIPPED | OUTPATIENT
Start: 2022-01-20

## 2022-01-20 NOTE — PATIENT INSTRUCTIONS
Well Visit, Men 48 to 72: Care Instructions  Overview     Well visits can help you stay healthy. Your doctor has checked your overall health and may have suggested ways to take good care of yourself. Your doctor also may have recommended tests. At home, you can help prevent illness with healthy eating, regular exercise, and other steps. Follow-up care is a key part of your treatment and safety. Be sure to make and go to all appointments, and call your doctor if you are having problems. It's also a good idea to know your test results and keep a list of the medicines you take. How can you care for yourself at home? · Get screening tests that you and your doctor decide on. Screening helps find diseases before any symptoms appear. · Eat healthy foods. Choose fruits, vegetables, whole grains, protein, and low-fat dairy foods. Limit fat, especially saturated fat. Reduce salt in your diet. · Limit alcohol. Have no more than 2 drinks a day or 14 drinks a week. · Get at least 30 minutes of exercise on most days of the week. Walking is a good choice. You also may want to do other activities, such as running, swimming, cycling, or playing tennis or team sports. · Reach and stay at a healthy weight. This will lower your risk for many problems, such as obesity, diabetes, heart disease, and high blood pressure. · Do not smoke. Smoking can make health problems worse. If you need help quitting, talk to your doctor about stop-smoking programs and medicines. These can increase your chances of quitting for good. · Care for your mental health. It is easy to get weighed down by worry and stress. Learn strategies to manage stress, like deep breathing and mindfulness, and stay connected with your family and community. If you find you often feel sad or hopeless, talk with your doctor. Treatment can help. · Talk to your doctor about whether you have any risk factors for sexually transmitted infections (STIs).  You can help prevent STIs if you wait to have sex with a new partner (or partners) until you've each been tested for STIs. It also helps if you use condoms (male or female condoms) and if you limit your sex partners to one person who only has sex with you. Vaccines are available for some STIs. · If it's important to you to prevent pregnancy with your partner, talk with your doctor about birth control options that might be best for you. · If you think you may have a problem with alcohol or drug use, talk to your doctor. This includes prescription medicines (such as amphetamines and opioids) and illegal drugs (such as cocaine and methamphetamine). Your doctor can help you figure out what type of treatment is best for you. · Protect your skin from too much sun. When you're outdoors from 10 a.m. to 4 p.m., stay in the shade or cover up with clothing and a hat with a wide brim. Wear sunglasses that block UV rays. Even when it's cloudy, put broad-spectrum sunscreen (SPF 30 or higher) on any exposed skin. · See a dentist one or two times a year for checkups and to have your teeth cleaned. · Wear a seat belt in the car. When should you call for help? Watch closely for changes in your health, and be sure to contact your doctor if you have any problems or symptoms that concern you. Where can you learn more? Go to http://www.gray.com/  Enter O933 in the search box to learn more about \"Well Visit, Men 48 to 72: Care Instructions. \"  Current as of: February 11, 2021               Content Version: 13.0  © 3051-7927 [employer]wise, Incorporated. Care instructions adapted under license by [employer] (which disclaims liability or warranty for this information). If you have questions about a medical condition or this instruction, always ask your healthcare professional. [address] any warranty or liability for your use of this information.

## 2022-01-20 NOTE — PROGRESS NOTES
1. \"Have you been to the ER, urgent care clinic since your last visit? Hospitalized since your last visit? \" Yes Where: [facility] for sciatic nerve    2. \"Have you seen or consulted any other health care providers outside of the [address] [place] [address] since your last visit? \" No     3. For patients aged 39-70: Has the patient had a colonoscopy / FIT/ Cologuard? Yes - no Care Gap present      If the patient is female:    4. For patients aged 41-77: Has the patient had a mammogram within the past 2 years? NA - based on age or sex  See top three    5. For patients aged 21-65: Has the patient had a pap smear?  NA - based on age or sex

## 2022-01-20 NOTE — PROGRESS NOTES
Chief Complaint   Patient presents with    Numbness     right toe numbness      Physical     Subjective:   Trey Spurling is a 63 y.o. male presenting for his annual checkup. ROS:  Feeling well. No dyspnea or chest pain on exertion. No abdominal pain, change in bowel habits, black or bloody stools. No urinary tract or prostatic symptoms. No neurological complaints. Specific concerns today:    Still with low back pain which is better but his leg symptoms are worse. He has a clear radicular line of numbness running down his thigh to his leg to his greater toe. He has weakness of his ankle flexion and hip. He has this on the right side. He has not had relief with neurontin. He has had a prednisone course. He has been to the chiropractor for a few sessions through the [place], doing home PT, and had one acupuncture course out of pocket. He has regular follow-up with his endocrinologist for hypothyroidism. Palpitations usually signal thyroid is off basline and has had a few lately. He does want labs. Last time this happened, adjusting meds after abnormal labs. Rash under arms have resolved. Doing well on lexapro. He has no complaints of depression or anxiety at this time. Current Outpatient Medications   Medication Sig Dispense Refill    flaxseed oil (OMEGA 3 PO) Take 2 Capsules by mouth two (2) times a day.  cholecalciferol, vitamin D3, (VITAMIN D3 PO) Take 2 Tablets by mouth daily.  MELATONIN PO Take  by mouth nightly as needed.  fluocinolone acetonide oiL 0.01 % drop 5 drops in each ear bid x 5-7 days 20 mL 0    Synthroid 150 mcg tablet Take 1 Tab by mouth daily.  zolpidem (AMBIEN) 5 mg tablet Take 1 Tab by mouth nightly as needed.  triamcinolone acetonide (KENALOG) 0.1 % topical cream Apply  to affected area two (2) times a day. use thin layer 45 g 0    FOLIC ACID PO Take 1 Tab by mouth daily.  vardenafil (LEVITRA) 20 mg tablet Take 20 mg by mouth as needed. 30 Tab 5    escitalopram oxalate (LEXAPRO) 20 mg tablet Take 1 Tab by mouth daily. 90 Tab 3    cyanocobalamin (VITAMIN B-12) 500 mcg tablet Take 1,500 mcg by mouth daily.  cpap machine kit by Does Not Apply route. Auto CPAP at Range of 5-15  with heated humidifier. Mask: Mask of choice, please refit. Length of need 99 months. Replace mask and accessories as needed times 12 months. Download data at 30 days and fax at [phone]. Supplies for 99 month 1 Kit 0    atorvastatin (LIPITOR) 20 mg tablet take 1 tablet by mouth at bedtime 90 Tablet 3    ammonium lactate (Lac-Hydrin) 12 % topical cream Apply  to affected area two (2) times a day. rub in to affected area on feet as needed. (Patient not taking: Reported on 1/20/2022) 280 g 11    glucose blood VI test strips (ASCENSIA AUTODISC VI, ONE TOUCH ULTRA TEST VI) strip Check fasting and 1 hour after meals as needed. (Patient not taking: Reported on 1/20/2022) 50 Strip 11    lancets misc Use as directed. (Patient not taking: Reported on 1/20/2022) 3 Package 3    Blood-Glucose Meter monitoring kit Use as directed (Patient not taking: Reported on 1/20/2022) 1 Kit 0    ascorbate calcium (VITAMIN C PO) Take 1 Tab by mouth daily. (Patient not taking: Reported on 1/20/2022)      clotrimazole-betamethasone (LOTRISONE) topical cream Apply to left medial shin - pea sized amount or less twice daily for 2 weeks (Patient not taking: Reported on 1/20/2022) 15 g 0    MV-MN/FA/LYCOPENE/LUT/HB#178 (BRYSON MULTIVITAMIN FOR MEN PO) Take  by mouth. (Patient not taking: Reported on 1/20/2022)       No Known Allergies  Past Medical History:   Diagnosis Date    Allergic rhinitis 3/27/2010    Anxiety     Arthritis     Diabetes ([facility]     Dishydrosis     Dyslipidemia     Echocardiogram 09/17/10    EF 60-65%. RVSP 15-20 mmHg.  PUJA    ED (erectile dysfunction)     Hashimoto thyroiditis     intermittently occurs    Hx of colonoscopy 07/01/2005, 10/18/2013    [facility]/Normal; grade 1-to grade 2 internal hemorrhoids    Hypothyroidism 1983    Dr. Marcellus Harvey    Hypovitaminosis D     Thallium stress test 09/17/10    Neg stress test.  Ex. time 11:12.    Unspecified sleep apnea      Past Surgical History:   Procedure Laterality Date    COLONOSCOPY  2005    early due to bloody stool; next colonoscopy due 2015-[address] HX LAP CHOLECYSTECTOMY  9/2014    HX TONSILLECTOMY      as a child     Family History   Problem Relation Age of Onset    Diabetes Father     Heart Disease Father     Diabetes Paternal Aunt     Diabetes Paternal Uncle     Diabetes Paternal Grandmother     Diabetes Paternal Grandfather     Thyroid Disease Brother     OSTEOARTHRITIS Brother      Social History     Tobacco Use    Smoking status: Former Smoker     Packs/day: 0.30     Years: 9.00     Pack years: 2.70     Types: Cigarettes    Smokeless tobacco: Never Used   Substance Use Topics    Alcohol use: No       Objective:     Visit Vitals  /76 (BP 1 Location: Left upper arm, BP Patient Position: Sitting, BP Cuff Size: Large adult)   Pulse 77   Temp 97.7 °F (36.5 °C) (Temporal)   Resp 16   Ht 5' 11\" (1.803 m)   Wt 230 lb (104.3 kg)   SpO2 97%   BMI 32.08 kg/m²     The patient appears well, alert, oriented x 3, in no distress. ENT normal.  Neck supple. No thyromegaly. SARITA. Lungs are clear, good air entry, no wheezes, rhonchi or rales. S1 and S2 normal, no murmurs, regular rate and rhythm. Abdomen is soft without tenderness, guarding, mass or organomegaly. Extremities show no edema. Neurological is normal without focal findings. Back:  Positive SLR on the right. Neuro:  Weakness of ankle dorsiflexion and toe extension (4/5). He is unable to sustain a single leg squat on the right. He has no sensation along his greater toe, foot, and leg along the L5 dermatome. Psych: normal affect. Mood good. Oriented x 3. Judgement and insight intact. No adenopathy. Results for orders placed or performed in visit on 12/22/21   HEMOGLOBIN A1C, EXTERNAL   Result Value Ref Range    Hemoglobin A1c, External 6.1 %     Assessment/Plan:       ICD-10-CM ICD-9-CM    1. Well adult exam  Z00.00 V70.0 CBC WITH AUTOMATED DIFF      METABOLIC PANEL, COMPREHENSIVE   2. Controlled type 2 diabetes mellitus with complication, without long-term current use of insulin (HCC)  E11.8 250.90 HEMOGLOBIN A1C W/O EAG      MICROALBUMIN, UR, RAND W/ MICROALB/CREAT RATIO      HM DIABETES FOOT EXAM   3. Hypercholesterolemia  E78.00 272.0 LIPID PANEL   4. Hypothyroidism due to acquired atrophy of thyroid  E03.4 244.8 TSH 3RD GENERATION     246.8    5. Obesity, Class I, BMI 30-34.9  E66.9 278.00    6. Vitamin D deficiency  E55.9 268.9 VITAMIN D, 25 HYDROXY   7. Lumbar radicular pain  M54.16 724.4    8. Screening for prostate cancer  Z12.5 V76.44 PSA SCREENING (SCREENING)     Age and sex specific counseling. Labs ordered. Cont to work on diet and exercise. Continue current care. Refills as needed. Cont per endocrinology. Advised on annual eye exam for diabetes. Lumbar radiculopathy, weakness of foot, and numbness - needs MRI. Initially not approved by insurance but this is important to get done due to worsening symptoms. All chart history elements were reviewed by me at the time of the visit even though marked at time of note closure. Patient understands our medical plan. Patient has provided input and agrees with goals. Alternatives have been explained and offered. All questions answered. The patient is to call if condition worsens or fails to improve. Follow-up and Dispositions    · Return in about 1 year (around 1/20/2023) for physical. Fasting labs due in 2 weeks.

## 2022-02-02 ENCOUNTER — TELEPHONE (OUTPATIENT)
Dept: FAMILY MEDICINE CLINIC | Age: 62
End: 2022-02-02

## 2022-02-02 NOTE — TELEPHONE ENCOUNTER
Yamil Rivers from [address] [place] [address] called stating that an authorization claim was started on 2021 for the pt but it was denied by [facility]. Yamil Rivers states he is not sure if a peer to peer was done but in order to resubmit you have to wait 45 days after the peer to peer period and it has only been 21 days. If you do resubmit an appeal to start another authorization claim, it has to be submitted to the home plan and you have to fax over pertinent information. When you fax it over you have to include patients name, , Insurance ID, and case # and include clinical notes. It also needs a fax cover sheet with the phrase Request to Appeal located at the top. The fax number is [phone] or [phone]. Please advise.

## 2022-02-08 NOTE — TELEPHONE ENCOUNTER
D/w patient. He will  order and try to get this done at the [facility] since insurance is placing barriers to his care.

## 2022-02-24 NOTE — PROGRESS NOTES
1. Have you been to the ER, urgent care clinic since your last visit? Hospitalized since your last visit? No    2. Have you seen or consulted any other health care providers outside of the [address] [place] [address] since your last visit? Include any pap smears or colon screening.  Yes Where: [facility]    Eye exam scheduled for 11/4/2020 @ [facility] in [place] details… detailed exam

## 2022-08-05 DIAGNOSIS — L30.0 NUMMULAR ECZEMA: ICD-10-CM

## 2022-08-05 DIAGNOSIS — L29.9 ITCHING: ICD-10-CM

## 2022-08-05 RX ORDER — TRIAMCINOLONE ACETONIDE 1 MG/G
CREAM TOPICAL
Qty: 30 G | Refills: 0 | Status: SHIPPED | OUTPATIENT
Start: 2022-08-05 | End: 2022-08-15

## 2022-08-10 ENCOUNTER — HOSPITAL ENCOUNTER (OUTPATIENT)
Dept: LAB | Age: 62
Discharge: HOME | End: 2022-08-10

## 2022-08-10 LAB — XX-LABCORP SPECIMEN COL,LCBCF: NORMAL

## 2022-08-10 PROCEDURE — 99001 SPECIMEN HANDLING PT-LAB: CPT

## 2022-08-11 LAB
25(OH)D3+25(OH)D2 SERPL-MCNC: 25 NG/ML (ref 30–100)
ALBUMIN SERPL-MCNC: 4.2 G/DL (ref 3.8–4.9)
ALBUMIN/CREAT UR: 6 MG/G CREAT (ref 0–29)
ALBUMIN/GLOB SERPL: 1.4 {RATIO} (ref 1.2–2.2)
ALP SERPL-CCNC: 75 IU/L (ref 44–121)
ALT SERPL-CCNC: 33 IU/L (ref 0–44)
AST SERPL-CCNC: 29 IU/L (ref 0–40)
BASOPHILS # BLD AUTO: 0.1 X10E3/UL (ref 0–0.2)
BASOPHILS NFR BLD AUTO: 1 %
BILIRUB SERPL-MCNC: 0.5 MG/DL (ref 0–1.2)
BUN SERPL-MCNC: 14 MG/DL (ref 8–27)
BUN/CREAT SERPL: 15 (ref 10–24)
CALCIUM SERPL-MCNC: 9 MG/DL (ref 8.6–10.2)
CHLORIDE SERPL-SCNC: 103 MMOL/L (ref 96–106)
CHOLEST SERPL-MCNC: 181 MG/DL (ref 100–199)
CO2 SERPL-SCNC: 25 MMOL/L (ref 20–29)
CREAT SERPL-MCNC: 0.95 MG/DL (ref 0.76–1.27)
CREAT UR-MCNC: 221 MG/DL
EGFR: 92 ML/MIN/1.73
EOSINOPHIL # BLD AUTO: 0.2 X10E3/UL (ref 0–0.4)
EOSINOPHIL NFR BLD AUTO: 3 %
ERYTHROCYTE [DISTWIDTH] IN BLOOD BY AUTOMATED COUNT: 14.4 % (ref 11.6–15.4)
GLOBULIN SER CALC-MCNC: 3.1 G/DL (ref 1.5–4.5)
GLUCOSE SERPL-MCNC: 114 MG/DL (ref 65–99)
HBA1C MFR BLD: 6.3 % (ref 4.8–5.6)
HCT VFR BLD AUTO: 42.2 % (ref 37.5–51)
HDLC SERPL-MCNC: 46 MG/DL
HGB BLD-MCNC: 14 G/DL (ref 13–17.7)
IMM GRANULOCYTES # BLD AUTO: 0 X10E3/UL (ref 0–0.1)
IMM GRANULOCYTES NFR BLD AUTO: 0 %
IMP & REVIEW OF LAB RESULTS: NORMAL
LDLC SERPL CALC-MCNC: 99 MG/DL (ref 0–99)
LYMPHOCYTES # BLD AUTO: 2.1 X10E3/UL (ref 0.7–3.1)
LYMPHOCYTES NFR BLD AUTO: 36 %
MCH RBC QN AUTO: 28.2 PG (ref 26.6–33)
MCHC RBC AUTO-ENTMCNC: 33.2 G/DL (ref 31.5–35.7)
MCV RBC AUTO: 85 FL (ref 79–97)
MICROALBUMIN UR-MCNC: 13.1 UG/ML
MONOCYTES # BLD AUTO: 0.5 X10E3/UL (ref 0.1–0.9)
MONOCYTES NFR BLD AUTO: 9 %
NEUTROPHILS # BLD AUTO: 3.1 X10E3/UL (ref 1.4–7)
NEUTROPHILS NFR BLD AUTO: 51 %
PLATELET # BLD AUTO: 193 X10E3/UL (ref 150–450)
POTASSIUM SERPL-SCNC: 4.4 MMOL/L (ref 3.5–5.2)
PROT SERPL-MCNC: 7.3 G/DL (ref 6–8.5)
PSA SERPL-MCNC: 1.7 NG/ML (ref 0–4)
RBC # BLD AUTO: 4.97 X10E6/UL (ref 4.14–5.8)
SODIUM SERPL-SCNC: 140 MMOL/L (ref 134–144)
TRIGL SERPL-MCNC: 211 MG/DL (ref 0–149)
TSH SERPL DL<=0.005 MIU/L-ACNC: 0.97 UIU/ML (ref 0.45–4.5)
VLDLC SERPL CALC-MCNC: 36 MG/DL (ref 5–40)
WBC # BLD AUTO: 5.9 X10E3/UL (ref 3.4–10.8)